# Patient Record
Sex: FEMALE | Race: WHITE | Employment: OTHER | URBAN - NONMETROPOLITAN AREA
[De-identification: names, ages, dates, MRNs, and addresses within clinical notes are randomized per-mention and may not be internally consistent; named-entity substitution may affect disease eponyms.]

---

## 2016-11-28 LAB
ALBUMIN SERPL-MCNC: 4.1 G/DL
ALP BLD-CCNC: 104 U/L
ALT SERPL-CCNC: 41 U/L
AST SERPL-CCNC: 28 U/L
BILIRUB SERPL-MCNC: 0.6 MG/DL (ref 0.1–1.4)
BUN BLDV-MCNC: 27 MG/DL
CALCIUM SERPL-MCNC: 9.4 MG/DL
CHLORIDE BLD-SCNC: 104 MMOL/L
CHOLESTEROL, TOTAL: 139 MG/DL
CHOLESTEROL/HDL RATIO: NORMAL
CO2: 28 MMOL/L
CREAT SERPL-MCNC: 0.8 MG/DL
GFR CALCULATED: NORMAL
GLUCOSE BLD-MCNC: 75 MG/DL
HDLC SERPL-MCNC: 61 MG/DL (ref 35–70)
LDL CHOLESTEROL CALCULATED: 62 MG/DL (ref 0–160)
POTASSIUM SERPL-SCNC: 4.1 MMOL/L
SODIUM BLD-SCNC: 143 MMOL/L
TOTAL PROTEIN: 6.5
TRIGL SERPL-MCNC: 81 MG/DL
VLDLC SERPL CALC-MCNC: NORMAL MG/DL

## 2017-03-07 ENCOUNTER — OFFICE VISIT (OUTPATIENT)
Dept: CARDIOLOGY CLINIC | Age: 72
End: 2017-03-07

## 2017-03-07 VITALS
SYSTOLIC BLOOD PRESSURE: 108 MMHG | WEIGHT: 98 LBS | HEIGHT: 63 IN | BODY MASS INDEX: 17.36 KG/M2 | DIASTOLIC BLOOD PRESSURE: 60 MMHG | RESPIRATION RATE: 16 BRPM | HEART RATE: 74 BPM

## 2017-03-07 DIAGNOSIS — I10 ESSENTIAL HYPERTENSION: ICD-10-CM

## 2017-03-07 DIAGNOSIS — I25.10 CAD S/P PERCUTANEOUS CORONARY ANGIOPLASTY: Primary | ICD-10-CM

## 2017-03-07 DIAGNOSIS — E78.2 MIXED HYPERLIPIDEMIA: ICD-10-CM

## 2017-03-07 DIAGNOSIS — Z98.61 CAD S/P PERCUTANEOUS CORONARY ANGIOPLASTY: Primary | ICD-10-CM

## 2017-03-07 PROCEDURE — G8484 FLU IMMUNIZE NO ADMIN: HCPCS | Performed by: INTERNAL MEDICINE

## 2017-03-07 PROCEDURE — 1123F ACP DISCUSS/DSCN MKR DOCD: CPT | Performed by: INTERNAL MEDICINE

## 2017-03-07 PROCEDURE — G8598 ASA/ANTIPLAT THER USED: HCPCS | Performed by: INTERNAL MEDICINE

## 2017-03-07 PROCEDURE — G8427 DOCREV CUR MEDS BY ELIG CLIN: HCPCS | Performed by: INTERNAL MEDICINE

## 2017-03-07 PROCEDURE — G8419 CALC BMI OUT NRM PARAM NOF/U: HCPCS | Performed by: INTERNAL MEDICINE

## 2017-03-07 PROCEDURE — 4040F PNEUMOC VAC/ADMIN/RCVD: CPT | Performed by: INTERNAL MEDICINE

## 2017-03-07 PROCEDURE — 3017F COLORECTAL CA SCREEN DOC REV: CPT | Performed by: INTERNAL MEDICINE

## 2017-03-07 PROCEDURE — 99214 OFFICE O/P EST MOD 30 MIN: CPT | Performed by: INTERNAL MEDICINE

## 2017-03-07 PROCEDURE — 3014F SCREEN MAMMO DOC REV: CPT | Performed by: INTERNAL MEDICINE

## 2017-03-07 PROCEDURE — 1036F TOBACCO NON-USER: CPT | Performed by: INTERNAL MEDICINE

## 2017-03-07 PROCEDURE — 1090F PRES/ABSN URINE INCON ASSESS: CPT | Performed by: INTERNAL MEDICINE

## 2017-03-07 PROCEDURE — 93000 ELECTROCARDIOGRAM COMPLETE: CPT | Performed by: INTERNAL MEDICINE

## 2017-03-07 PROCEDURE — G8400 PT W/DXA NO RESULTS DOC: HCPCS | Performed by: INTERNAL MEDICINE

## 2017-03-07 RX ORDER — PRAVASTATIN SODIUM 20 MG
20 TABLET ORAL DAILY
Qty: 90 TABLET | Refills: 3 | Status: SHIPPED | OUTPATIENT
Start: 2017-03-07 | End: 2018-03-06 | Stop reason: SDUPTHER

## 2017-03-07 RX ORDER — ATENOLOL 25 MG/1
25 TABLET ORAL DAILY
Qty: 90 TABLET | Refills: 3 | Status: SHIPPED | OUTPATIENT
Start: 2017-03-07 | End: 2018-03-06

## 2017-06-05 LAB
ALBUMIN SERPL-MCNC: 3.9 G/DL
ALP BLD-CCNC: 94 U/L
ALT SERPL-CCNC: 34 U/L
ANION GAP SERPL CALCULATED.3IONS-SCNC: NORMAL MMOL/L
AST SERPL-CCNC: 30 U/L
BILIRUB SERPL-MCNC: 0.5 MG/DL (ref 0.1–1.4)
BUN BLDV-MCNC: 23 MG/DL
CALCIUM SERPL-MCNC: NORMAL MG/DL
CHLORIDE BLD-SCNC: 105 MMOL/L
CHOLESTEROL, TOTAL: 146 MG/DL
CHOLESTEROL/HDL RATIO: NORMAL
CO2: NORMAL MMOL/L
CREAT SERPL-MCNC: 0.9 MG/DL
GFR CALCULATED: NORMAL
GLUCOSE BLD-MCNC: 80 MG/DL
HDLC SERPL-MCNC: 57 MG/DL (ref 35–70)
LDL CHOLESTEROL CALCULATED: 71 MG/DL (ref 0–160)
POTASSIUM SERPL-SCNC: 4.3 MMOL/L
SODIUM BLD-SCNC: 144 MMOL/L
TOTAL PROTEIN: NORMAL
TRIGL SERPL-MCNC: 89 MG/DL
VLDLC SERPL CALC-MCNC: NORMAL MG/DL

## 2017-06-15 ENCOUNTER — HOSPITAL ENCOUNTER (OUTPATIENT)
Dept: MAMMOGRAPHY | Age: 72
Discharge: HOME | End: 2017-06-15
Attending: FAMILY MEDICINE | Admitting: FAMILY MEDICINE

## 2017-06-15 DIAGNOSIS — Z12.31 VISIT FOR SCREENING MAMMOGRAM: ICD-10-CM

## 2017-12-18 LAB
ALBUMIN SERPL-MCNC: 3.9 G/DL
ALP BLD-CCNC: 86 U/L
ALT SERPL-CCNC: 34 U/L
ANION GAP SERPL CALCULATED.3IONS-SCNC: NORMAL MMOL/L
AST SERPL-CCNC: 30 U/L
BILIRUB SERPL-MCNC: 0.4 MG/DL (ref 0.1–1.4)
BUN BLDV-MCNC: 30 MG/DL
CALCIUM SERPL-MCNC: NORMAL MG/DL
CHLORIDE BLD-SCNC: 106 MMOL/L
CHOLESTEROL, TOTAL: 148 MG/DL
CHOLESTEROL/HDL RATIO: NORMAL
CO2: NORMAL MMOL/L
CREAT SERPL-MCNC: 0.9 MG/DL
GFR CALCULATED: NORMAL
GLUCOSE BLD-MCNC: 83 MG/DL
HDLC SERPL-MCNC: 57 MG/DL (ref 35–70)
LDL CHOLESTEROL CALCULATED: 68 MG/DL (ref 0–160)
POTASSIUM SERPL-SCNC: 4 MMOL/L
SODIUM BLD-SCNC: 145 MMOL/L
TOTAL PROTEIN: NORMAL
TRIGL SERPL-MCNC: 114 MG/DL
VLDLC SERPL CALC-MCNC: NORMAL MG/DL

## 2018-03-06 ENCOUNTER — OFFICE VISIT (OUTPATIENT)
Dept: CARDIOLOGY CLINIC | Age: 73
End: 2018-03-06

## 2018-03-06 VITALS
HEART RATE: 60 BPM | DIASTOLIC BLOOD PRESSURE: 68 MMHG | RESPIRATION RATE: 14 BRPM | SYSTOLIC BLOOD PRESSURE: 130 MMHG | BODY MASS INDEX: 17.01 KG/M2 | HEIGHT: 63 IN | WEIGHT: 96 LBS

## 2018-03-06 DIAGNOSIS — E78.2 MIXED HYPERLIPIDEMIA: ICD-10-CM

## 2018-03-06 DIAGNOSIS — I25.10 CAD S/P PERCUTANEOUS CORONARY ANGIOPLASTY: Primary | ICD-10-CM

## 2018-03-06 DIAGNOSIS — I10 ESSENTIAL HYPERTENSION: ICD-10-CM

## 2018-03-06 DIAGNOSIS — Z98.61 CAD S/P PERCUTANEOUS CORONARY ANGIOPLASTY: Primary | ICD-10-CM

## 2018-03-06 PROCEDURE — G8484 FLU IMMUNIZE NO ADMIN: HCPCS | Performed by: INTERNAL MEDICINE

## 2018-03-06 PROCEDURE — 3014F SCREEN MAMMO DOC REV: CPT | Performed by: INTERNAL MEDICINE

## 2018-03-06 PROCEDURE — 1036F TOBACCO NON-USER: CPT | Performed by: INTERNAL MEDICINE

## 2018-03-06 PROCEDURE — 1090F PRES/ABSN URINE INCON ASSESS: CPT | Performed by: INTERNAL MEDICINE

## 2018-03-06 PROCEDURE — G8598 ASA/ANTIPLAT THER USED: HCPCS | Performed by: INTERNAL MEDICINE

## 2018-03-06 PROCEDURE — 99214 OFFICE O/P EST MOD 30 MIN: CPT | Performed by: INTERNAL MEDICINE

## 2018-03-06 PROCEDURE — 1123F ACP DISCUSS/DSCN MKR DOCD: CPT | Performed by: INTERNAL MEDICINE

## 2018-03-06 PROCEDURE — G8419 CALC BMI OUT NRM PARAM NOF/U: HCPCS | Performed by: INTERNAL MEDICINE

## 2018-03-06 PROCEDURE — 4040F PNEUMOC VAC/ADMIN/RCVD: CPT | Performed by: INTERNAL MEDICINE

## 2018-03-06 PROCEDURE — 93000 ELECTROCARDIOGRAM COMPLETE: CPT | Performed by: INTERNAL MEDICINE

## 2018-03-06 PROCEDURE — G8400 PT W/DXA NO RESULTS DOC: HCPCS | Performed by: INTERNAL MEDICINE

## 2018-03-06 PROCEDURE — 3017F COLORECTAL CA SCREEN DOC REV: CPT | Performed by: INTERNAL MEDICINE

## 2018-03-06 PROCEDURE — G8427 DOCREV CUR MEDS BY ELIG CLIN: HCPCS | Performed by: INTERNAL MEDICINE

## 2018-03-06 RX ORDER — PRAVASTATIN SODIUM 20 MG
20 TABLET ORAL DAILY
Qty: 90 TABLET | Refills: 3 | Status: SHIPPED | OUTPATIENT
Start: 2018-03-06 | End: 2019-03-11 | Stop reason: SDUPTHER

## 2018-03-06 RX ORDER — ASCORBIC ACID 500 MG
1000 TABLET ORAL DAILY
COMMUNITY

## 2018-03-06 RX ORDER — CHOLECALCIFEROL (VITAMIN D3) 125 MCG
5 CAPSULE ORAL DAILY
COMMUNITY

## 2018-03-06 RX ORDER — HYDROCORTISONE ACETATE 0.5 %
CREAM (GRAM) TOPICAL
COMMUNITY
End: 2019-03-11

## 2018-03-06 RX ORDER — CALCIUM CARBONATE 500(1250)
500 TABLET ORAL 2 TIMES DAILY
COMMUNITY

## 2018-03-06 RX ORDER — BIOTIN 5 MG
TABLET ORAL
COMMUNITY

## 2018-03-06 RX ORDER — LANOLIN ALCOHOL/MO/W.PET/CERES
1000 CREAM (GRAM) TOPICAL DAILY
COMMUNITY

## 2018-03-06 RX ORDER — M-VIT,TX,IRON,MINS/CALC/FOLIC 27MG-0.4MG
1 TABLET ORAL DAILY
COMMUNITY

## 2018-03-06 RX ORDER — ATENOLOL 25 MG/1
12.5 TABLET ORAL DAILY
COMMUNITY
End: 2018-03-06 | Stop reason: SDUPTHER

## 2018-03-06 RX ORDER — ACETAMINOPHEN 160 MG
TABLET,DISINTEGRATING ORAL
COMMUNITY
End: 2019-03-11

## 2018-03-06 RX ORDER — NITROGLYCERIN 0.4 MG/1
0.4 TABLET SUBLINGUAL EVERY 5 MIN PRN
Qty: 25 TABLET | Refills: 2 | Status: SHIPPED | OUTPATIENT
Start: 2018-03-06 | End: 2019-03-11 | Stop reason: SDUPTHER

## 2018-03-06 RX ORDER — ALPRAZOLAM 0.25 MG/1
0.25 TABLET ORAL NIGHTLY PRN
COMMUNITY

## 2018-03-06 RX ORDER — ATENOLOL 25 MG/1
12.5 TABLET ORAL DAILY
Qty: 45 TABLET | Refills: 3 | Status: SHIPPED | OUTPATIENT
Start: 2018-03-06 | End: 2020-03-16 | Stop reason: SDUPTHER

## 2018-03-06 RX ORDER — AMPICILLIN TRIHYDRATE 250 MG
CAPSULE ORAL
COMMUNITY

## 2018-03-06 NOTE — ASSESSMENT & PLAN NOTE
Stable and asymptomatic. Continue aggressive risk modification for secondary prevention and follow-up.

## 2018-03-06 NOTE — PROGRESS NOTES
Roberto Medrano  1945  Ann-Marie Rodríguez MD    Chief complaint and HPI:  Roberto Medrano  this 71-year-old female follows up for known coronary artery disease status post PCI in , hypertension and hyperlipidemia. She denies any chest pain, shortness of breath, lightheadedness, syncope or near-syncope. She denies orthopnea or paroxysmal nocturnal dyspnea. She does have occasional palpitations last only for a few seconds. She has been very compliant to her current medications and she does exercise regularly 20 minutes 6 days a week on a treadmill. She is not a smoker. She has fibromyalgia which has been under control on Lyrica therapy. She recently had a grandchild and the has 2 sons both live in [place] and she commutes back and forth quite a bit to help him out. Rest of the Cardiovascular system review is otherwise unchanged from prior encounter. Past medical history:  has a past medical history of Fibromyalgia; H/O 24 hour EKG monitoring; H/O cardiac catheterization; H/O cardiovascular stress test; Heart attack; History of chest x-ray; History of PTCA; Hyperlipidemia; Hypertension; and MI (myocardial infarction). Past surgical history:  has a past surgical history that includes Percutaneous Transluminal Coronary Angio (); Breast biopsy ();  section; Appendectomy; Cataract removal (2013); and Skin cancer excision (2017). Social History:   Social History   Substance Use Topics    Smoking status: Never Smoker    Smokeless tobacco: Never Used    Alcohol use No     Family history: family history includes Cancer in her mother; Diabetes in her brother and father; Heart Disease in her father; High Blood Pressure in her brother; High Cholesterol in her sister. ALLERGIES:  Sulfa antibiotics  Prior to Admission medications    Medication Sig Start Date End Date Taking?  Authorizing Provider   ALPRAZolam (XANAX) 0.25 MG tablet Take 0.25 mg by mouth nightly as needed for Sleep  prescriptions if needed on this visit are addressed. After visit summery is provided. Questions answered and patient verbalizes understanding. Follow up in office in 12 months with ECG, sooner if needed. Oneyda Mcadams MD, 3/6/2018 10:27 AM     Please note this report has been partially produced using speech recognition software and may contain errors related to that system including errors in grammar, punctuation, and spelling, as well as words and phrases that may be inappropriate. If there are any questions or concerns please feel free to contact the dictating provider for clarification.

## 2018-06-28 LAB
ALBUMIN SERPL-MCNC: 4.2 G/DL
ALP BLD-CCNC: 93 U/L
ALT SERPL-CCNC: 36 U/L
ANION GAP SERPL CALCULATED.3IONS-SCNC: NORMAL MMOL/L
AST SERPL-CCNC: 35 U/L
BILIRUB SERPL-MCNC: 0.6 MG/DL (ref 0.1–1.4)
BUN BLDV-MCNC: 27 MG/DL
CALCIUM SERPL-MCNC: NORMAL MG/DL
CHLORIDE BLD-SCNC: 106 MMOL/L
CHOLESTEROL, TOTAL: 151 MG/DL
CHOLESTEROL/HDL RATIO: NORMAL
CO2: NORMAL MMOL/L
CREAT SERPL-MCNC: 1 MG/DL
GFR CALCULATED: NORMAL
GLUCOSE BLD-MCNC: 89 MG/DL
HDLC SERPL-MCNC: 55 MG/DL (ref 35–70)
LDL CHOLESTEROL CALCULATED: 75 MG/DL (ref 0–160)
POTASSIUM SERPL-SCNC: 4 MMOL/L
SODIUM BLD-SCNC: 147 MMOL/L
TOTAL PROTEIN: NORMAL
TRIGL SERPL-MCNC: 105 MG/DL
VLDLC SERPL CALC-MCNC: NORMAL MG/DL

## 2018-07-11 ENCOUNTER — HOSPITAL ENCOUNTER (OUTPATIENT)
Dept: MAMMOGRAPHY | Age: 73
Discharge: HOME | End: 2018-07-11
Attending: FAMILY MEDICINE | Admitting: FAMILY MEDICINE

## 2018-07-11 DIAGNOSIS — Z12.31 VISIT FOR SCREENING MAMMOGRAM: ICD-10-CM

## 2019-01-03 LAB
ALBUMIN SERPL-MCNC: 4 G/DL
ALP BLD-CCNC: 85 U/L
ALT SERPL-CCNC: 36 U/L
ANION GAP SERPL CALCULATED.3IONS-SCNC: NORMAL MMOL/L
AST SERPL-CCNC: 35 U/L
BILIRUB SERPL-MCNC: 0.6 MG/DL (ref 0.1–1.4)
BUN BLDV-MCNC: 35 MG/DL
CALCIUM SERPL-MCNC: NORMAL MG/DL
CHLORIDE BLD-SCNC: 105 MMOL/L
CHOLESTEROL, TOTAL: 147 MG/DL
CHOLESTEROL/HDL RATIO: NORMAL
CO2: NORMAL MMOL/L
CREAT SERPL-MCNC: 0.9 MG/DL
GFR CALCULATED: NORMAL
GLUCOSE BLD-MCNC: 88 MG/DL
HDLC SERPL-MCNC: 56 MG/DL (ref 35–70)
LDL CHOLESTEROL CALCULATED: 71 MG/DL (ref 0–160)
POTASSIUM SERPL-SCNC: 4.3 MMOL/L
SODIUM BLD-SCNC: 142 MMOL/L
TOTAL PROTEIN: NORMAL
TRIGL SERPL-MCNC: 98 MG/DL
VLDLC SERPL CALC-MCNC: NORMAL MG/DL

## 2019-03-11 ENCOUNTER — OFFICE VISIT (OUTPATIENT)
Dept: CARDIOLOGY CLINIC | Age: 74
End: 2019-03-11
Payer: MEDICARE

## 2019-03-11 VITALS
BODY MASS INDEX: 17.19 KG/M2 | RESPIRATION RATE: 16 BRPM | SYSTOLIC BLOOD PRESSURE: 106 MMHG | HEART RATE: 76 BPM | DIASTOLIC BLOOD PRESSURE: 60 MMHG | WEIGHT: 97 LBS | HEIGHT: 63 IN

## 2019-03-11 DIAGNOSIS — Z98.61 CAD S/P PERCUTANEOUS CORONARY ANGIOPLASTY: Primary | ICD-10-CM

## 2019-03-11 DIAGNOSIS — I25.10 CAD S/P PERCUTANEOUS CORONARY ANGIOPLASTY: Primary | ICD-10-CM

## 2019-03-11 DIAGNOSIS — I10 ESSENTIAL HYPERTENSION: ICD-10-CM

## 2019-03-11 DIAGNOSIS — E78.2 MIXED HYPERLIPIDEMIA: ICD-10-CM

## 2019-03-11 PROCEDURE — 93000 ELECTROCARDIOGRAM COMPLETE: CPT | Performed by: INTERNAL MEDICINE

## 2019-03-11 PROCEDURE — 99213 OFFICE O/P EST LOW 20 MIN: CPT | Performed by: INTERNAL MEDICINE

## 2019-03-11 RX ORDER — PRAVASTATIN SODIUM 20 MG
20 TABLET ORAL DAILY
Qty: 90 TABLET | Refills: 3 | Status: SHIPPED | OUTPATIENT
Start: 2019-03-11 | End: 2021-03-31

## 2019-03-11 RX ORDER — NITROGLYCERIN 0.4 MG/1
0.4 TABLET SUBLINGUAL EVERY 5 MIN PRN
Qty: 25 TABLET | Refills: 2 | Status: SHIPPED | OUTPATIENT
Start: 2019-03-11 | End: 2020-03-16 | Stop reason: SDUPTHER

## 2019-04-03 ENCOUNTER — HOSPITAL ENCOUNTER (OUTPATIENT)
Age: 74
Setting detail: OBSERVATION
Discharge: HOME | End: 2019-04-04
Attending: EMERGENCY MEDICINE | Admitting: EMERGENCY MEDICINE
Payer: MEDICARE

## 2019-04-03 ENCOUNTER — APPOINTMENT (OUTPATIENT)
Dept: GENERAL RADIOLOGY | Age: 74
End: 2019-04-03
Payer: MEDICARE

## 2019-04-03 DIAGNOSIS — K85.90 ACUTE PANCREATITIS, UNSPECIFIED COMPLICATION STATUS, UNSPECIFIED PANCREATITIS TYPE: Primary | ICD-10-CM

## 2019-04-03 PROBLEM — R10.13 EPIGASTRIC PAIN: Status: ACTIVE | Noted: 2019-04-03

## 2019-04-03 PROBLEM — A08.4 VIRAL GASTROENTERITIS: Status: ACTIVE | Noted: 2019-04-03

## 2019-04-03 LAB
ALBUMIN SERPL-MCNC: 3.7 GM/DL (ref 3.4–5)
ALP BLD-CCNC: 128 IU/L (ref 40–129)
ALT SERPL-CCNC: 15 U/L (ref 10–40)
ANION GAP SERPL CALCULATED.3IONS-SCNC: 11 MMOL/L (ref 4–16)
AST SERPL-CCNC: 20 IU/L (ref 15–37)
BACTERIA: NEGATIVE /HPF
BASOPHILS ABSOLUTE: 0 K/CU MM
BASOPHILS RELATIVE PERCENT: 0.4 % (ref 0–1)
BILIRUB SERPL-MCNC: 0.4 MG/DL (ref 0–1)
BILIRUBIN URINE: NEGATIVE MG/DL
BLOOD, URINE: NEGATIVE
BUN BLDV-MCNC: 23 MG/DL (ref 6–23)
CALCIUM SERPL-MCNC: 10.5 MG/DL (ref 8.3–10.6)
CAST TYPE: NORMAL /HPF
CHLORIDE BLD-SCNC: 100 MMOL/L (ref 99–110)
CLARITY: CLEAR
CO2: 30 MMOL/L (ref 21–32)
COLOR: YELLOW
CREAT SERPL-MCNC: 0.9 MG/DL (ref 0.6–1.1)
CRYSTAL TYPE: NORMAL /HPF
DIFFERENTIAL TYPE: ABNORMAL
EKG ATRIAL RATE: 87 BPM
EKG DIAGNOSIS: NORMAL
EKG P AXIS: 58 DEGREES
EKG P-R INTERVAL: 162 MS
EKG Q-T INTERVAL: 374 MS
EKG QRS DURATION: 88 MS
EKG QTC CALCULATION (BAZETT): 450 MS
EKG R AXIS: 52 DEGREES
EKG T AXIS: 72 DEGREES
EKG VENTRICULAR RATE: 87 BPM
EOSINOPHILS ABSOLUTE: 0.1 K/CU MM
EOSINOPHILS RELATIVE PERCENT: 1.2 % (ref 0–3)
EPITHELIAL CELLS, UA: NORMAL /HPF
GFR AFRICAN AMERICAN: >60 ML/MIN/1.73M2
GFR NON-AFRICAN AMERICAN: >60 ML/MIN/1.73M2
GLUCOSE BLD-MCNC: 111 MG/DL (ref 70–99)
GLUCOSE, URINE: NEGATIVE MG/DL
HCT VFR BLD CALC: 40.3 % (ref 37–47)
HEMOGLOBIN: 12.7 GM/DL (ref 12.5–16)
IMMATURE NEUTROPHIL %: 2.2 % (ref 0–0.43)
KETONES, URINE: NEGATIVE MG/DL
LACTATE: 1.4 MMOL/L (ref 0.4–2)
LEUKOCYTE ESTERASE, URINE: NEGATIVE
LIPASE: 80 IU/L (ref 13–60)
LYMPHOCYTES ABSOLUTE: 1.3 K/CU MM
LYMPHOCYTES RELATIVE PERCENT: 19.3 % (ref 24–44)
MCH RBC QN AUTO: 29.6 PG (ref 27–31)
MCHC RBC AUTO-ENTMCNC: 31.5 % (ref 32–36)
MCV RBC AUTO: 93.9 FL (ref 78–100)
MONOCYTES ABSOLUTE: 0.4 K/CU MM
MONOCYTES RELATIVE PERCENT: 5.5 % (ref 0–4)
MUCUS: NEGATIVE HPF
NITRITE URINE, QUANTITATIVE: NEGATIVE
PDW BLD-RTO: 12.5 % (ref 11.7–14.9)
PH, URINE: 6 (ref 5–8)
PLATELET # BLD: 405 K/CU MM (ref 140–440)
PMV BLD AUTO: 9.4 FL (ref 7.5–11.1)
POTASSIUM SERPL-SCNC: 4.3 MMOL/L (ref 3.5–5.1)
PROTEIN UA: NEGATIVE MG/DL
RBC # BLD: 4.29 M/CU MM (ref 4.2–5.4)
RBC URINE: NORMAL /HPF (ref 0–6)
SEGMENTED NEUTROPHILS ABSOLUTE COUNT: 4.8 K/CU MM
SEGMENTED NEUTROPHILS RELATIVE PERCENT: 71.4 % (ref 36–66)
SODIUM BLD-SCNC: 141 MMOL/L (ref 135–145)
SPECIFIC GRAVITY UA: <1.005 (ref 1–1.03)
TOTAL IMMATURE NEUTOROPHIL: 0.15 K/CU MM
TOTAL PROTEIN: 7.7 GM/DL (ref 6.4–8.2)
TROPONIN T: <0.01 NG/ML
UROBILINOGEN, URINE: 0.2 MG/DL (ref 0.2–1)
VOLUME, (UVOL): 12 ML (ref 10–12)
WBC # BLD: 6.8 K/CU MM (ref 4–10.5)
WBC UA: NORMAL /HPF (ref 0–5)

## 2019-04-03 PROCEDURE — 83605 ASSAY OF LACTIC ACID: CPT

## 2019-04-03 PROCEDURE — 36415 COLL VENOUS BLD VENIPUNCTURE: CPT

## 2019-04-03 PROCEDURE — 2580000003 HC RX 258: Performed by: EMERGENCY MEDICINE

## 2019-04-03 PROCEDURE — 71045 X-RAY EXAM CHEST 1 VIEW: CPT

## 2019-04-03 PROCEDURE — 80053 COMPREHEN METABOLIC PANEL: CPT

## 2019-04-03 PROCEDURE — 84484 ASSAY OF TROPONIN QUANT: CPT

## 2019-04-03 PROCEDURE — 93010 ELECTROCARDIOGRAM REPORT: CPT | Performed by: INTERNAL MEDICINE

## 2019-04-03 PROCEDURE — 93005 ELECTROCARDIOGRAM TRACING: CPT | Performed by: EMERGENCY MEDICINE

## 2019-04-03 PROCEDURE — 83690 ASSAY OF LIPASE: CPT

## 2019-04-03 PROCEDURE — 6370000000 HC RX 637 (ALT 250 FOR IP): Performed by: FAMILY MEDICINE

## 2019-04-03 PROCEDURE — 85025 COMPLETE CBC W/AUTO DIFF WBC: CPT

## 2019-04-03 PROCEDURE — 2580000003 HC RX 258: Performed by: FAMILY MEDICINE

## 2019-04-03 PROCEDURE — G0378 HOSPITAL OBSERVATION PER HR: HCPCS

## 2019-04-03 PROCEDURE — 81001 URINALYSIS AUTO W/SCOPE: CPT

## 2019-04-03 PROCEDURE — 99284 EMERGENCY DEPT VISIT MOD MDM: CPT

## 2019-04-03 RX ORDER — NITROGLYCERIN 0.4 MG/1
0.4 TABLET SUBLINGUAL EVERY 5 MIN PRN
Status: DISCONTINUED | OUTPATIENT
Start: 2019-04-03 | End: 2019-04-04 | Stop reason: HOSPADM

## 2019-04-03 RX ORDER — FAMOTIDINE 20 MG/1
20 TABLET, FILM COATED ORAL 2 TIMES DAILY
Status: DISCONTINUED | OUTPATIENT
Start: 2019-04-03 | End: 2019-04-03 | Stop reason: DRUGHIGH

## 2019-04-03 RX ORDER — ASPIRIN 81 MG/1
81 TABLET, CHEWABLE ORAL DAILY
Status: DISCONTINUED | OUTPATIENT
Start: 2019-04-04 | End: 2019-04-04 | Stop reason: HOSPADM

## 2019-04-03 RX ORDER — HEPARIN SODIUM 5000 [USP'U]/ML
5000 INJECTION, SOLUTION INTRAVENOUS; SUBCUTANEOUS 2 TIMES DAILY
Status: DISCONTINUED | OUTPATIENT
Start: 2019-04-03 | End: 2019-04-04 | Stop reason: HOSPADM

## 2019-04-03 RX ORDER — 0.9 % SODIUM CHLORIDE 0.9 %
1000 INTRAVENOUS SOLUTION INTRAVENOUS ONCE
Status: COMPLETED | OUTPATIENT
Start: 2019-04-03 | End: 2019-04-03

## 2019-04-03 RX ORDER — ONDANSETRON 2 MG/ML
4 INJECTION INTRAMUSCULAR; INTRAVENOUS EVERY 6 HOURS PRN
Status: DISCONTINUED | OUTPATIENT
Start: 2019-04-03 | End: 2019-04-04 | Stop reason: HOSPADM

## 2019-04-03 RX ORDER — CALCIUM CARBONATE 500(1250)
500 TABLET ORAL 2 TIMES DAILY
Status: DISCONTINUED | OUTPATIENT
Start: 2019-04-03 | End: 2019-04-04 | Stop reason: HOSPADM

## 2019-04-03 RX ORDER — PREGABALIN 50 MG/1
50 CAPSULE ORAL NIGHTLY
Status: DISCONTINUED | OUTPATIENT
Start: 2019-04-03 | End: 2019-04-04 | Stop reason: HOSPADM

## 2019-04-03 RX ORDER — SODIUM CHLORIDE 0.9 % (FLUSH) 0.9 %
10 SYRINGE (ML) INJECTION EVERY 12 HOURS SCHEDULED
Status: DISCONTINUED | OUTPATIENT
Start: 2019-04-03 | End: 2019-04-04 | Stop reason: HOSPADM

## 2019-04-03 RX ORDER — ALPRAZOLAM 0.25 MG/1
0.25 TABLET ORAL NIGHTLY PRN
Status: DISCONTINUED | OUTPATIENT
Start: 2019-04-03 | End: 2019-04-04 | Stop reason: HOSPADM

## 2019-04-03 RX ORDER — CHOLECALCIFEROL (VITAMIN D3) 125 MCG
5 CAPSULE ORAL DAILY
Status: DISCONTINUED | OUTPATIENT
Start: 2019-04-03 | End: 2019-04-03 | Stop reason: CLARIF

## 2019-04-03 RX ORDER — SODIUM CHLORIDE 9 MG/ML
INJECTION, SOLUTION INTRAVENOUS CONTINUOUS
Status: DISCONTINUED | OUTPATIENT
Start: 2019-04-03 | End: 2019-04-04 | Stop reason: HOSPADM

## 2019-04-03 RX ORDER — SODIUM CHLORIDE 0.9 % (FLUSH) 0.9 %
10 SYRINGE (ML) INJECTION PRN
Status: DISCONTINUED | OUTPATIENT
Start: 2019-04-03 | End: 2019-04-04 | Stop reason: HOSPADM

## 2019-04-03 RX ORDER — ATENOLOL 25 MG/1
12.5 TABLET ORAL DAILY
Status: DISCONTINUED | OUTPATIENT
Start: 2019-04-04 | End: 2019-04-04 | Stop reason: HOSPADM

## 2019-04-03 RX ORDER — FAMOTIDINE 20 MG/1
20 TABLET, FILM COATED ORAL DAILY
Status: DISCONTINUED | OUTPATIENT
Start: 2019-04-03 | End: 2019-04-04 | Stop reason: HOSPADM

## 2019-04-03 RX ADMIN — FAMOTIDINE 20 MG: 20 TABLET ORAL at 15:32

## 2019-04-03 RX ADMIN — SODIUM CHLORIDE: 9 INJECTION, SOLUTION INTRAVENOUS at 15:32

## 2019-04-03 RX ADMIN — MELATONIN TAB 3 MG 5 MG: 3 TAB at 20:49

## 2019-04-03 RX ADMIN — SODIUM CHLORIDE 1000 ML: 9 INJECTION, SOLUTION INTRAVENOUS at 09:29

## 2019-04-03 RX ADMIN — PREGABALIN 50 MG: 50 CAPSULE ORAL at 20:49

## 2019-04-03 RX ADMIN — Medication 500 MG: at 20:49

## 2019-04-03 ASSESSMENT — PAIN SCALES - GENERAL
PAINLEVEL_OUTOF10: 0
PAINLEVEL_OUTOF10: 4
PAINLEVEL_OUTOF10: 4

## 2019-04-03 ASSESSMENT — PAIN DESCRIPTION - DESCRIPTORS: DESCRIPTORS: ACHING

## 2019-04-03 ASSESSMENT — PAIN DESCRIPTION - ORIENTATION: ORIENTATION: LOWER;POSTERIOR

## 2019-04-03 ASSESSMENT — PAIN DESCRIPTION - PROGRESSION: CLINICAL_PROGRESSION: NOT CHANGED

## 2019-04-03 ASSESSMENT — PAIN DESCRIPTION - PAIN TYPE
TYPE: ACUTE PAIN
TYPE: ACUTE PAIN

## 2019-04-03 ASSESSMENT — PAIN DESCRIPTION - FREQUENCY: FREQUENCY: CONTINUOUS

## 2019-04-03 ASSESSMENT — PAIN DESCRIPTION - ONSET: ONSET: PROGRESSIVE

## 2019-04-03 ASSESSMENT — PAIN - FUNCTIONAL ASSESSMENT: PAIN_FUNCTIONAL_ASSESSMENT: PREVENTS OR INTERFERES SOME ACTIVE ACTIVITIES AND ADLS

## 2019-04-03 ASSESSMENT — PAIN DESCRIPTION - LOCATION
LOCATION: HEAD
LOCATION: HEAD

## 2019-04-03 NOTE — ED NOTES
I assisted this patient to the restroom. She walked with no difficulty. She provided a urine sample that I took the the lab.      Mic Ramirez  04/03/19 4151

## 2019-04-03 NOTE — ED NOTES
Dr. Tyrell Weber is speaking with Dr. Francine Trinidad regarding admission for this patient.      Rj Cordon  04/03/19 1111

## 2019-04-03 NOTE — ED PROVIDER NOTES
Emergency Department Encounter  Location: [facility] [address] [place] [address]    Patient: Chelo Turner  MRN: [id number]  : 1945  Date of evaluation: 4/3/2019  ED Provider: Jael Alcantar DO, FACEP    Chief Complaint:    Fatigue (Pt arrives ambulatory stating since 3/23/2019 started upper respiratory illness and weakness, pt has been to urgent care x2 dx with flu, tamilflu and Zpack, pt states yesterday she felt worse); Headache (pt has headache, weakness, epigastric pain and not able to eat. Pt concerned d/t heart rate high and blood pressure high yesterday went to family doctor today who sent to ed); and Abdominal Pain    [place]:  Chelo Turner is a 69 y.o. female that presents to the emergency department with complaints of upper respiratory illness and generalized weakness that's been present for about 12 days. She was started on Tamiflu which she completed and then was recently started on a Z-Akin but states she is no better. She's complaining of high blood pressure and high heart rate that was present yesterday. She is having generalized abdominal discomfort. She denies chest pain or shortness of breath but states she is quite fatigued. She's had no fevers but is complaining of congestion in her sinuses. She denies sore throat or cough. ROS:  At least 10 systems reviewed and otherwise acutely negative except as in the [address]     Past Medical History:   Diagnosis Date    Fibromyalgia     H/O 24 hour EKG monitoring 2001    nsr averate rate 70bpm variation between   rare isolated pacs no ventricular ectopy noted    H/O cardiac catheterization 2008    Lm normal, lad normal, cx patent, rca dominant and patent,  lv hyperdynamic wall motion ef 61    H/O cardiovascular stress test 2008    mild ischemia in the mid anterior mid anteroseptal an dapical anterior regions, this is new change from previous study, ef 70 exercise capacity 7 mets    Heart attack ([facility] [address]     History of sexual activity: Not on file   Other Topics Concern    Not on file   Social History Narrative    Not on file     No current facility-administered medications for this encounter. Current Outpatient Medications   Medication Sig Dispense Refill    nitroGLYCERIN (NITROSTAT) 0.4 MG SL tablet Place 1 tablet under the tongue every 5 minutes as needed (prn) 25 tablet 2    pravastatin (PRAVACHOL) 20 MG tablet Take 1 tablet by mouth daily 90 tablet 3    ALPRAZolam (XANAX) 0.25 MG tablet Take 0.25 mg by mouth nightly as needed for Sleep 1/4 tablet as needed .  Multiple Vitamins-Minerals (THERAPEUTIC MULTIVITAMIN-MINERALS) tablet Take 1 tablet by mouth daily      Coenzyme Q10 (COQ10) 200 MG CAPS Take by mouth      melatonin 5 MG TABS tablet Take 5 mg by mouth daily      vitamin B-12 (CYANOCOBALAMIN) 1000 MCG tablet Take 1,000 mcg by mouth daily      Omega-3 Fatty Acids (MINI FISH OIL) 645 MG CAPS Take by mouth      calcium carbonate (OSCAL) 500 MG TABS tablet Take 500 mg by mouth 2 times daily      vitamin C (ASCORBIC ACID) 500 MG tablet Take 1,000 mg by mouth daily      Biotin 5 MG TABS Take by mouth      atenolol (TENORMIN) 25 MG tablet Take 0.5 tablets by mouth daily 45 tablet 3    aspirin 81 MG tablet Take 81 mg by mouth daily.  pregabalin (LYRICA) 50 MG capsule Take 50 mg by mouth nightly. Allergies   Allergen Reactions    Sulfa Antibiotics Other (See Comments)     Hands and feet peeled. Nausea       Nursing Notes Reviewed    Physical Exam:  ED Triage Vitals [04/03/19 0850]   Enc Vitals Group      BP (!) 160/96      Pulse 99      Resp 16      Temp 97.9 °F (36.6 °C)      Temp Source Oral      SpO2 100 %      Weight 95 lb (43.1 kg)      Height 5' 3\" (1.6 m)      Head Circumference       Peak Flow       Pain Score       Pain Loc       Pain Edu? Excl. in [address]? GENERAL APPEARANCE: Awake and alert. Cooperative. No acute distress. Nontoxic in appearance  HEAD: Normocephalic. Atraumatic. EYES: EOM's grossly intact. Sclera anicteric. ENT: Tolerates saliva. No trismus. Positive for posterior pharyngeal drainage. Tympanic membranes are clear bilaterally. NECK: Supple. Trachea midline. No meningeal signs  CARDIO: RRR. Radial pulse 2+. LUNGS: Respirations unlabored. CTAB. ABDOMEN: Soft. Non-distended. Non-tender. EXTREMITIES: No acute deformities. No peripheral edema seen  SKIN: Warm and dry. NEUROLOGICAL: No gross facial drooping. Moves all 4 extremities spontaneously. PSYCHIATRIC: Normal mood.      Labs:  Results for orders placed or performed during the hospital encounter of 04/03/19   CBC Auto Differential   Result Value Ref Range    WBC 6.8 4.0 - 10.5 K/CU MM    RBC 4.29 4.2 - 5.4 M/CU MM    Hemoglobin 12.7 12.5 - 16.0 GM/DL    Hematocrit 40.3 37 - 47 %    MCV 93.9 78 - 100 FL    MCH 29.6 27 - 31 PG    MCHC 31.5 (L) 32.0 - 36.0 %    RDW 12.5 11.7 - 14.9 %    Platelets 187 215 - 722 K/CU MM    MPV 9.4 7.5 - 11.1 FL    Differential Type AUTOMATED DIFFERENTIAL     Segs Relative 71.4 (H) 36 - 66 %    Lymphocytes % 19.3 (L) 24 - 44 %    Monocytes % 5.5 (H) 0 - 4 %    Eosinophils % 1.2 0 - 3 %    Basophils % 0.4 0 - 1 %    Segs Absolute 4.8 K/CU MM    Lymphocytes # 1.3 K/CU MM    Monocytes # 0.4 K/CU MM    Eosinophils # 0.1 K/CU MM    Basophils # 0.0 K/CU MM    Immature Neutrophil % 2.2 (H) 0 - 0.43 %    Total Immature Neutrophil 0.15 K/CU MM   Comprehensive Metabolic Panel   Result Value Ref Range    Sodium 141 135 - 145 MMOL/L    Potassium 4.3 3.5 - 5.1 MMOL/L    Chloride 100 99 - 110 mMol/L    CO2 30 21 - 32 MMOL/L    BUN 23 6 - 23 MG/DL    CREATININE 0.9 0.6 - 1.1 MG/DL    Glucose 111 (H) 70 - 99 MG/DL    Calcium 10.5 8.3 - 10.6 MG/DL    Alb 3.7 3.4 - 5.0 GM/DL    Total Protein 7.7 6.4 - 8.2 GM/DL    Total Bilirubin 0.4 0.0 - 1.0 MG/DL    ALT 15 10 - 40 U/L    AST 20 15 - 37 IU/L    Alkaline Phosphatase 128 40 - 129 IU/L    GFR Non-African American >60 >60 mL/min/1.73m2 GFR African American >60 >60 mL/min/1.73m2    Anion Gap 11 4 - 16   Lactic Acid, Plasma   Result Value Ref Range    Lactate 1.4 0.4 - 2.0 mMOL/L   Urinalysis with Microscopic   Result Value Ref Range    Color, UA YELLOW YELLOW    Clarity, UA CLEAR CLEAR    Glucose, Urine NEGATIVE NEGATIVE MG/DL    Bilirubin Urine NEGATIVE NEGATIVE MG/DL    Ketones, Urine NEGATIVE NEGATIVE MG/DL    Specific Gravity, UA <1.005 1.001 - 1.035    Blood, Urine NEGATIVE NEGATIVE    pH, Urine 6.0 5.0 - 8.0    Protein, UA NEGATIVE NEGATIVE MG/DL    Urobilinogen, Urine 0.2 0.2 - 1.0 MG/DL    Nitrite Urine, Quantitative NEGATIVE NEGATIVE    Leukocyte Esterase, Urine NEGATIVE NEGATIVE    Volume, (UVOL) 12 10 - 12 ML    RBC, UA NO CELLS SEEN 0 - 6 /HPF    WBC, UA 0 TO 1 0 - 5 /HPF    Epi Cells NO CELLS SEEN /HPF    Cast Type NO CAST FORMS SEEN NO CAST FORMS SEEN /HPF    Bacteria, UA NEGATIVE NEGATIVE /HPF    Crystal Type NONE SEEN NEGATIVE /HPF    Mucus, UA NEGATIVE NEGATIVE HPF   Troponin   Result Value Ref Range    Troponin T <0.010 <0.01 NG/ML   Lipase   Result Value Ref Range    Lipase 80 (H) 13 - 60 IU/L   EKG 12 Lead   Result Value Ref Range    Ventricular Rate 87 BPM    Atrial Rate 87 BPM    P-R Interval 162 ms    QRS Duration 88 ms    Q-T Interval 374 ms    QTc Calculation (Bazett) 450 ms    P Axis 58 degrees    R Axis 52 degrees    T Axis 72 degrees    Diagnosis       Normal sinus rhythm  Normal ECG  No previous ECGs available         EKG (if obtained): (All EKG's are interpreted by myself in the absence of a cardiologist)  The Ekg interpreted by me in the absence of a cardiologist shows. normal sinus rhythm with a rate of 87  Axis is   Normal   QTc is  450  Intervals and Durations are unremarkable. No specific ST-T wave changes appreciated. No evidence of acute ischemia.    No significant change from prior EKG dated 11 March 2019      Radiographs (if obtained):  [] The following radiograph was interpreted by myself in the absence of a radiologist:  [x] Radiologist's Report reviewed at time of ED visit:  XR CHEST PORTABLE   Final Result   1. No acute cardiopulmonary disease. 2. COPD. ED Course and MDM:  Here in emergency department the patient's lipase is slightly elevated at 80. She is slightly tender in her epigastric region. She may have some medication or virally induced pancreatitis. At this time the patient has had a liter of fluid and states she's feeling slightly better. I have discussed her care with Dr. Felicia Keyes and he has agreed to admit her for observation and IV fluids to see if she will perk up after an overnight stay in the hospital.    Final Impression:  1. Acute pancreatitis, unspecified complication status, unspecified pancreatitis type      DISPOSITION Decision To Admit    Patient referred to: No follow-up provider specified.   Discharge medications:  New Prescriptions    No medications on file     (Please note that portions of this note may have been completed with a voice recognition program. Efforts were made to edit the dictations but occasionally words are mis-transcribed.)    Stevie Hunt DO, [facility]  Board certified in [address] [place] [address]  04/03/19 7706

## 2019-04-03 NOTE — H&P
Paulette Naik is a 69 y.o. who is admitted for generalized weakness with nausea vomiting and diarrhea  Hospital Day: 1    ASSESSMENT/PLAN:  1.  Nausea vomiting and diarrhea: Likely viral gastroenteritis, getting better now, has not had any diarrhea for about 12 hours, still a bit nauseous but has had no vomiting's either. We will continue with IV hydration and we'll see how she does. Next    Elevated lipase: Likely just a viral syndrome causing this, symptoms are inconsistent with the pancreatitis, we'll continue to monitor her and she will continue with a general diet and we'll check on this again tomorrow. Next    Hypertension with CAD: Appears asymptomatic, continue with the atenolol I without any changes in the doses    Anxiety disorder: Patient was mild anxiety, we'll continue with the Xanax that she gets nightly p.r.n. Diet  Gen. DVT Prophylaxis [] Lovenox, [x] Heparin, [] SCDs, [] Ambulation   GI Prophylaxis [] PPI, [x] H2 Blocker, [] Carafate, [] Diet/Tube Feeds   Code Status Full Code   Disposition Observation    MDM [] Low, [x] Moderate,[] High  Patient's risk as above due to complexity of data reviewed and medical management options         Chief Complaint:  Feeling generally unwell with some nausea vomiting    PCP: Gabriel Ochoa MD    Date of Admission: 4/3/2019        History Of Present Illness: The patient is a 69 y.o. female who [place] [facility] [place] with generally unwell and nausea vomiting, patient apparently was diagnosed with the influenza was given steroids and the Tamiflu about 2 weeks ago was still not feeling well with some nausea vomiting went over to her primary care without the patient was dehydrated and sent her over to the emergency room.   Patient denied any obvious sick contacts, no recent travel or unusual food habits, no antibiotics either    Past Medical History:

## 2019-04-03 NOTE — PROGRESS NOTES
Radha called for bed assignment for this patient for inpatient unit, this nurse assigned bed 14 to patient.

## 2019-04-04 VITALS
TEMPERATURE: 97 F | BODY MASS INDEX: 16.43 KG/M2 | HEART RATE: 80 BPM | HEIGHT: 63 IN | RESPIRATION RATE: 16 BRPM | OXYGEN SATURATION: 95 % | DIASTOLIC BLOOD PRESSURE: 74 MMHG | WEIGHT: 92.7 LBS | SYSTOLIC BLOOD PRESSURE: 133 MMHG

## 2019-04-04 PROBLEM — R10.13 EPIGASTRIC PAIN: Status: RESOLVED | Noted: 2019-04-03 | Resolved: 2019-04-04

## 2019-04-04 PROBLEM — A08.4 VIRAL GASTROENTERITIS: Status: RESOLVED | Noted: 2019-04-03 | Resolved: 2019-04-04

## 2019-04-04 LAB
EKG ATRIAL RATE: 84 BPM
EKG DIAGNOSIS: NORMAL
EKG P AXIS: 70 DEGREES
EKG P-R INTERVAL: 180 MS
EKG Q-T INTERVAL: 390 MS
EKG QRS DURATION: 92 MS
EKG QTC CALCULATION (BAZETT): 460 MS
EKG R AXIS: 50 DEGREES
EKG T AXIS: 72 DEGREES
EKG VENTRICULAR RATE: 84 BPM
HCT VFR BLD CALC: 35.5 % (ref 37–47)
HEMOGLOBIN: 10.9 GM/DL (ref 12.5–16)
LIPASE: 65 IU/L (ref 13–60)
MCH RBC QN AUTO: 29.6 PG (ref 27–31)
MCHC RBC AUTO-ENTMCNC: 30.7 % (ref 32–36)
MCV RBC AUTO: 96.5 FL (ref 78–100)
PDW BLD-RTO: 12.6 % (ref 11.7–14.9)
PLATELET # BLD: 393 K/CU MM (ref 140–440)
PMV BLD AUTO: 9.4 FL (ref 7.5–11.1)
RBC # BLD: 3.68 M/CU MM (ref 4.2–5.4)
WBC # BLD: 5.1 K/CU MM (ref 4–10.5)

## 2019-04-04 PROCEDURE — 85027 COMPLETE CBC AUTOMATED: CPT

## 2019-04-04 PROCEDURE — 2580000003 HC RX 258: Performed by: FAMILY MEDICINE

## 2019-04-04 PROCEDURE — 93005 ELECTROCARDIOGRAM TRACING: CPT | Performed by: FAMILY MEDICINE

## 2019-04-04 PROCEDURE — 93010 ELECTROCARDIOGRAM REPORT: CPT | Performed by: INTERNAL MEDICINE

## 2019-04-04 PROCEDURE — G0378 HOSPITAL OBSERVATION PER HR: HCPCS

## 2019-04-04 PROCEDURE — 83690 ASSAY OF LIPASE: CPT

## 2019-04-04 PROCEDURE — 36415 COLL VENOUS BLD VENIPUNCTURE: CPT

## 2019-04-04 PROCEDURE — 6370000000 HC RX 637 (ALT 250 FOR IP): Performed by: FAMILY MEDICINE

## 2019-04-04 RX ADMIN — FAMOTIDINE 20 MG: 20 TABLET ORAL at 08:38

## 2019-04-04 RX ADMIN — Medication 500 MG: at 08:38

## 2019-04-04 RX ADMIN — ASPIRIN 81 MG 81 MG: 81 TABLET ORAL at 08:38

## 2019-04-04 RX ADMIN — ATENOLOL 12.5 MG: 25 TABLET ORAL at 08:38

## 2019-04-04 RX ADMIN — SODIUM CHLORIDE: 9 INJECTION, SOLUTION INTRAVENOUS at 04:45

## 2019-04-04 ASSESSMENT — PAIN SCALES - GENERAL
PAINLEVEL_OUTOF10: 0

## 2019-04-04 NOTE — CARE COORDINATION
CM met with the patient for discharge planning. Patient lives at home with her , has insurance with Rx coverage & PCP, states she was independent with ADL's prior to admission, is retired, and still drives. Patient is unable to identify any needs at this time, CM available if needs arise.

## 2019-04-04 NOTE — PLAN OF CARE
Problem: Pain:  Goal: Pain level will decrease  Description  Pain level will decrease  Outcome: Ongoing  Goal: Control of acute pain  Description  Control of acute pain  Outcome: Ongoing  Goal: Control of chronic pain  Description  Control of chronic pain  Outcome: Ongoing     Problem: Discharge Planning:  Goal: Patients continuum of care needs are met  Description  Patients continuum of care needs are met  Outcome: Ongoing     Problem: Discharge Planning:  Goal: Discharged to appropriate level of care  Description  Discharged to appropriate level of care  Outcome: Ongoing     Problem: Pain:  Goal: Pain level will decrease  Description  Pain level will decrease  Outcome: Ongoing  Goal: Control of acute pain  Description  Control of acute pain  Outcome: Ongoing  Goal: Control of chronic pain  Description  Control of chronic pain  Outcome: Ongoing     Problem: Discharge Planning:  Goal: Patients continuum of care needs are met  Description  Patients continuum of care needs are met  Outcome: Ongoing     Problem: Discharge Planning:  Goal: Discharged to appropriate level of care  Description  Discharged to appropriate level of care  Outcome: Ongoing     Problem: Pain:  Goal: Pain level will decrease  Description  Pain level will decrease  Outcome: Ongoing  Goal: Control of acute pain  Description  Control of acute pain  Outcome: Ongoing  Goal: Control of chronic pain  Description  Control of chronic pain  Outcome: Ongoing     Problem: Discharge Planning:  Goal: Patients continuum of care needs are met  Description  Patients continuum of care needs are met  Outcome: Ongoing     Problem: Discharge Planning:  Goal: Discharged to appropriate level of care  Description  Discharged to appropriate level of care  Outcome: Ongoing     Problem: Pain:  Goal: Pain level will decrease  Description  Pain level will decrease  Outcome: Ongoing  Goal: Control of acute pain  Description  Control of acute pain  Outcome: Ongoing  Goal: Control of chronic pain  Description  Control of chronic pain  Outcome: Ongoing     Problem: Discharge Planning:  Goal: Patients continuum of care needs are met  Description  Patients continuum of care needs are met  Outcome: Ongoing     Problem: Discharge Planning:  Goal: Discharged to appropriate level of care  Description  Discharged to appropriate level of care  Outcome: Ongoing

## 2019-04-04 NOTE — DISCHARGE SUMMARY
Stable    Prognosis: Good    Disposition: Home      Follow-up with   1. PCP within   2-3    Days    Follow up labs: None       Discharge Physician Signed: Jayesh Henson M.D. Time spent on discharge in the examination, evaluation, counseling and review of medications and discharge plan: 25 minutes    Electronically signed by Jayesh Henson MD on 4/4/2019 at 9:37 AM      Comment: Please note this report has been produced using speech recognition software and may contain errors related to that system including errors in grammar, punctuation, and spelling, as well as words and phrases that may be inappropriate.  If there are any questions or concerns please feel free to contact the dictating provider for clarification

## 2019-07-18 ENCOUNTER — HOSPITAL ENCOUNTER (OUTPATIENT)
Dept: MAMMOGRAPHY | Age: 74
Discharge: HOME | End: 2019-07-18
Payer: MEDICARE

## 2019-07-18 DIAGNOSIS — Z12.31 VISIT FOR SCREENING MAMMOGRAM: ICD-10-CM

## 2019-07-18 PROCEDURE — 77063 BREAST TOMOSYNTHESIS BI: CPT

## 2020-01-17 LAB
ALBUMIN SERPL-MCNC: 4.1 G/DL
ALP BLD-CCNC: 96 U/L
ALT SERPL-CCNC: 20 U/L
ANION GAP SERPL CALCULATED.3IONS-SCNC: NORMAL MMOL/L
AST SERPL-CCNC: 29 U/L
BILIRUB SERPL-MCNC: 0.6 MG/DL (ref 0.1–1.4)
BUN BLDV-MCNC: 37 MG/DL
CALCIUM SERPL-MCNC: 9.7 MG/DL
CHLORIDE BLD-SCNC: 104 MMOL/L
CHOLESTEROL, TOTAL: 166 MG/DL
CHOLESTEROL/HDL RATIO: NORMAL
CO2: NORMAL
CREAT SERPL-MCNC: 1 MG/DL
GFR CALCULATED: NORMAL
GLUCOSE BLD-MCNC: 88 MG/DL
HDLC SERPL-MCNC: 57 MG/DL (ref 35–70)
LDL CHOLESTEROL CALCULATED: 90 MG/DL (ref 0–160)
POTASSIUM SERPL-SCNC: 4.3 MMOL/L
SODIUM BLD-SCNC: 142 MMOL/L
TOTAL PROTEIN: NORMAL
TRIGL SERPL-MCNC: 96 MG/DL
VLDLC SERPL CALC-MCNC: NORMAL MG/DL

## 2020-03-16 ENCOUNTER — OFFICE VISIT (OUTPATIENT)
Dept: CARDIOLOGY CLINIC | Age: 75
End: 2020-03-16
Payer: MEDICARE

## 2020-03-16 VITALS
BODY MASS INDEX: 17.36 KG/M2 | DIASTOLIC BLOOD PRESSURE: 64 MMHG | SYSTOLIC BLOOD PRESSURE: 126 MMHG | HEIGHT: 63 IN | HEART RATE: 70 BPM | WEIGHT: 98 LBS

## 2020-03-16 PROCEDURE — 1036F TOBACCO NON-USER: CPT | Performed by: INTERNAL MEDICINE

## 2020-03-16 PROCEDURE — G8419 CALC BMI OUT NRM PARAM NOF/U: HCPCS | Performed by: INTERNAL MEDICINE

## 2020-03-16 PROCEDURE — G8427 DOCREV CUR MEDS BY ELIG CLIN: HCPCS | Performed by: INTERNAL MEDICINE

## 2020-03-16 PROCEDURE — 1090F PRES/ABSN URINE INCON ASSESS: CPT | Performed by: INTERNAL MEDICINE

## 2020-03-16 PROCEDURE — 1123F ACP DISCUSS/DSCN MKR DOCD: CPT | Performed by: INTERNAL MEDICINE

## 2020-03-16 PROCEDURE — 3017F COLORECTAL CA SCREEN DOC REV: CPT | Performed by: INTERNAL MEDICINE

## 2020-03-16 PROCEDURE — 93000 ELECTROCARDIOGRAM COMPLETE: CPT | Performed by: INTERNAL MEDICINE

## 2020-03-16 PROCEDURE — G8484 FLU IMMUNIZE NO ADMIN: HCPCS | Performed by: INTERNAL MEDICINE

## 2020-03-16 PROCEDURE — 4040F PNEUMOC VAC/ADMIN/RCVD: CPT | Performed by: INTERNAL MEDICINE

## 2020-03-16 PROCEDURE — G8400 PT W/DXA NO RESULTS DOC: HCPCS | Performed by: INTERNAL MEDICINE

## 2020-03-16 PROCEDURE — 99214 OFFICE O/P EST MOD 30 MIN: CPT | Performed by: INTERNAL MEDICINE

## 2020-03-16 RX ORDER — PRAVASTATIN SODIUM 20 MG
20 TABLET ORAL DAILY
COMMUNITY
End: 2020-03-16 | Stop reason: SDUPTHER

## 2020-03-16 RX ORDER — PRAVASTATIN SODIUM 20 MG
20 TABLET ORAL DAILY
Qty: 90 TABLET | Refills: 3 | Status: SHIPPED | OUTPATIENT
Start: 2020-03-16 | End: 2021-03-31 | Stop reason: SDUPTHER

## 2020-03-16 RX ORDER — NITROGLYCERIN 0.4 MG/1
0.4 TABLET SUBLINGUAL EVERY 5 MIN PRN
Qty: 25 TABLET | Refills: 2 | Status: SHIPPED | OUTPATIENT
Start: 2020-03-16 | End: 2021-03-31 | Stop reason: SDUPTHER

## 2020-03-16 RX ORDER — ATENOLOL 25 MG/1
25 TABLET ORAL DAILY
Qty: 90 TABLET | Refills: 3 | Status: SHIPPED | OUTPATIENT
Start: 2020-03-16 | End: 2021-03-31 | Stop reason: SDUPTHER

## 2020-03-16 NOTE — PROGRESS NOTES
or thyromegaly  Cardiovascular:  Auscultation: Normal S1 and S2.  No murmurs or gallops noted. Carotids negative for bruits.  Abdominal aorta is normal.  No epigastric bruit noted. Peripheral pulses: 1+ equal on both sides  Respiratory:  Respiratory effort is normal  Breath sounds are clear to auscultation and percussion  Extremities:  No edema, clubbing,  Cyanosis, petechiae. SKIN: Warm and well perfused, no pallor or cyanosis  Abdomen:  No masses or tenderness. No organomegaly noted. Musculoskeletal:  No significant spinal deformities noted.  Gait is normal  Muscle strength is normal  Neurologic:  Oriented to time, place, and person and non-anxious. No focal neurological deficit noted. Psychiatric: Normal mood and effect. Neurologic exam:  No focal sensory or motor deficit    EKG today is consistent with normal sinus rhythm 70 bpm.    LAB REVIEW:  CBC:   Lab Results   Component Value Date    WBC 5.1 04/04/2019    HGB 10.9 04/04/2019    HCT 35.5 04/04/2019     04/04/2019     Lipids:   Lab Results   Component Value Date    CHOL 166 01/17/2020    TRIG 96 01/17/2020    HDL 57 01/17/2020    [facility] 90 01/17/2020     Renal:   Lab Results   Component Value Date    BUN 37 01/17/2020    CREATININE 1.00 01/17/2020     01/17/2020    K 4.3 01/17/2020     IMPRESSION and RECOMMENDATIONS:      CAD S/P percutaneous coronary angioplasty  Clinically stable. Continue aggressive risk modification for secondary prevention. Hyperlipidemia  Last LDL was 90. She has aches and pains related to fibromyalgia. Continue current low-dose statin therapy. Essential hypertension  Fairly well controlled on current medications continue the same. Continue current cardiovascular medications which have been reviewed and discussed individually with you. Primary/secondary prevention is the goal by aggressive risk modification, healthy and therapeutic life style changes for cardiovascular risk reduction.  Various goals are discussed and questions answered. Appropriate prescriptions if needed on this visit are addressed. After visit summery is provided. Questions answered and patient verbalizes understanding. Follow up in 12 months with ECG,  sooner if needed. Jasvir Coulter MD, 3/16/2020 2:27 PM     Please note this report has been partially produced using speech recognition software and may contain errors related to that system including errors in grammar, punctuation, and spelling, as well as words and phrases that may be inappropriate. If there are any questions or concerns please feel free to contact the dictating provider for clarification.

## 2020-03-16 NOTE — ASSESSMENT & PLAN NOTE
Last LDL was 90. She has aches and pains related to fibromyalgia. Continue current low-dose statin therapy.

## 2021-03-22 ENCOUNTER — OFFICE VISIT (OUTPATIENT)
Dept: CARDIOLOGY CLINIC | Age: 76
End: 2021-03-22
Payer: MEDICARE

## 2021-03-22 VITALS
HEART RATE: 80 BPM | BODY MASS INDEX: 17.12 KG/M2 | SYSTOLIC BLOOD PRESSURE: 128 MMHG | HEIGHT: 63 IN | WEIGHT: 96.6 LBS | DIASTOLIC BLOOD PRESSURE: 68 MMHG | TEMPERATURE: 97 F

## 2021-03-22 DIAGNOSIS — R06.02 SHORTNESS OF BREATH: ICD-10-CM

## 2021-03-22 DIAGNOSIS — I10 ESSENTIAL HYPERTENSION: ICD-10-CM

## 2021-03-22 DIAGNOSIS — I25.10 CAD S/P PERCUTANEOUS CORONARY ANGIOPLASTY: Primary | ICD-10-CM

## 2021-03-22 DIAGNOSIS — Z98.61 CAD S/P PERCUTANEOUS CORONARY ANGIOPLASTY: Primary | ICD-10-CM

## 2021-03-22 DIAGNOSIS — E78.00 PURE HYPERCHOLESTEROLEMIA: ICD-10-CM

## 2021-03-22 PROCEDURE — G8484 FLU IMMUNIZE NO ADMIN: HCPCS | Performed by: INTERNAL MEDICINE

## 2021-03-22 PROCEDURE — 4040F PNEUMOC VAC/ADMIN/RCVD: CPT | Performed by: INTERNAL MEDICINE

## 2021-03-22 PROCEDURE — 99214 OFFICE O/P EST MOD 30 MIN: CPT | Performed by: INTERNAL MEDICINE

## 2021-03-22 PROCEDURE — 3017F COLORECTAL CA SCREEN DOC REV: CPT | Performed by: INTERNAL MEDICINE

## 2021-03-22 PROCEDURE — 93000 ELECTROCARDIOGRAM COMPLETE: CPT | Performed by: INTERNAL MEDICINE

## 2021-03-22 PROCEDURE — G8427 DOCREV CUR MEDS BY ELIG CLIN: HCPCS | Performed by: INTERNAL MEDICINE

## 2021-03-22 PROCEDURE — G8400 PT W/DXA NO RESULTS DOC: HCPCS | Performed by: INTERNAL MEDICINE

## 2021-03-22 PROCEDURE — 1036F TOBACCO NON-USER: CPT | Performed by: INTERNAL MEDICINE

## 2021-03-22 PROCEDURE — 1090F PRES/ABSN URINE INCON ASSESS: CPT | Performed by: INTERNAL MEDICINE

## 2021-03-22 PROCEDURE — G8419 CALC BMI OUT NRM PARAM NOF/U: HCPCS | Performed by: INTERNAL MEDICINE

## 2021-03-22 PROCEDURE — 1123F ACP DISCUSS/DSCN MKR DOCD: CPT | Performed by: INTERNAL MEDICINE

## 2021-03-22 NOTE — PATIENT INSTRUCTIONS
Continue current cardiovascular medications which have been reviewed and discussed individually with you. Continue regular exercises. Primary/secondary prevention is the goal by aggressive risk modification, healthy and therapeutic life style changes for cardiovascular risk reduction. Various goals are discussed and questions answered. Follow-up in 12 months with EKG,. sooner if needed.

## 2021-03-22 NOTE — PROGRESS NOTES
Destiny Encarnacion (:  1945) is a 77 y.o. female,     Patient is here for further evaluation and follow up for known coronary artery disease status post PCI in  and has hypertension and hyperlipidemia. She denies any chest pains or shortness of breath or leg swelling or claudication. She denies any palpitations syncope or near syncope. She has been compliant with her medications. She walks on a treadmill for more than a mile and 18 minutes daily without any problems. She walks at a speed of 3.8 miles an hour. She does not smoke and she is compliant with her medications. Her medications and recent labs from PCP are reviewed from January this year. Patient had both COVID-19 vaccines and has been observing for preventive measures. Allergies   Allergen Reactions    Sulfa Antibiotics Other (See Comments)     Hands and feet peeled. Nausea     Prior to Admission medications    Medication Sig Start Date End Date Taking? Authorizing Provider   atenolol (TENORMIN) 25 MG tablet Take 1 tablet by mouth daily Take as instructed 3/16/20  Yes Marlon Foreman MD   nitroGLYCERIN (NITROSTAT) 0.4 MG SL tablet Place 1 tablet under the tongue every 5 minutes as needed (prn) 3/16/20  Yes Marlon Foreman MD   pravastatin (PRAVACHOL) 20 MG tablet Take 1 tablet by mouth daily 3/16/20  Yes Marlon Foreman MD   ALPRAZolam Eppie Rory) 0.25 MG tablet Take 0.25 mg by mouth nightly as needed for Sleep 1/4 tablet as needed .    Yes Historical Provider, MD   Multiple Vitamins-Minerals (THERAPEUTIC MULTIVITAMIN-MINERALS) tablet Take 1 tablet by mouth daily   Yes Historical Provider, MD   Coenzyme Q10 (COQ10) 200 MG CAPS Take by mouth   Yes Historical Provider, MD   melatonin 5 MG TABS tablet Take 5 mg by mouth daily   Yes Historical Provider, MD   vitamin B-12 (CYANOCOBALAMIN) 1000 MCG tablet Take 1,000 mcg by mouth daily   Yes Historical Provider, MD   Omega-3 Fatty Acids (MINI FISH OIL) 645 MG CAPS Take by mouth   Yes Historical nonpalpable. No epigastric bruit noted. Peripheral pulses: Pedal pulses are 1-2+ equal in both feet. Respiratory:  Respiratory effort is normal. Breath sounds are clear to auscultation. Extremities:  No edema, clubbing,  Cyanosis, petechiae. Abdomen:  No masses or tenderness. No organomegaly noted. Neurologic:  Oriented to time, place, and person and non-anxious. No focal neurological deficit noted. Psychiatric: Normal mood and effect. EKG today is consistent with normal sinus rhythm 80 bpm essentially normal EKG. Pertinent records reviewed and discussed with patient and results are as follow:    Lab Results   Component Value Date    WBC 5.1 04/04/2019    HGB 10.9 04/04/2019    HCT 35.5 04/04/2019     04/04/2019     Lab Results   Component Value Date    CHOL 166 01/17/2020    TRIG 96 01/17/2020    HDL 57 01/17/2020    [address] 90 01/17/2020   Lipids on January 28, 2021 reviewed and PCPs records total cholesterol 148, HDL 60 and LDL 66 and triglycerides 109  Lab Results   Component Value Date    BUN 37 01/17/2020    CREATININE 1.00 01/17/2020     01/17/2020    K 4.3 01/17/2020   BUN 32 creatinine 1.0 potassium 4.2 sodium 142    ASSESSMENT/PLAN:    1. CAD S/P percutaneous coronary angioplasty  Assessment & Plan:  Clinically stable. Continue aggressive risk factor modification for secondary prevention. 2. Shortness of breath  -     EKG 12 lead; Future  3. Essential hypertension  Assessment & Plan:  Well-controlled on current medications continue the same. 4. Pure hypercholesterolemia  Assessment & Plan:  Lipids have improved since last year. LDL dropped from 90 last January to 77 this year in January. Continue current low-dose statin therapy. Continue current cardiovascular medications which have been reviewed and discussed individually with you. Continue regular exercises.  Primary/secondary prevention is the goal by aggressive risk modification, healthy and therapeutic life style changes for cardiovascular risk reduction. Various goals are discussed and questions answered. Follow-up in 12 months with EKG,. sooner if needed. Counseled to continue to follow CDC guidelines for prevention of COVID-19. On this date 3/22/2021 I have spent 25 minutes reviewing previous notes, test results and face to face with the patient discussing the diagnosis and importance of compliance with the treatment plan as well as documenting on the day of the visit. An electronic signature was used to authenticate this note.     --Eliu Mcgraw MD

## 2021-03-31 RX ORDER — ATENOLOL 25 MG/1
25 TABLET ORAL DAILY
Qty: 90 TABLET | Refills: 3 | Status: SHIPPED | OUTPATIENT
Start: 2021-03-31 | End: 2022-03-17

## 2021-03-31 RX ORDER — NITROGLYCERIN 0.4 MG/1
0.4 TABLET SUBLINGUAL EVERY 5 MIN PRN
Qty: 25 TABLET | Refills: 2 | Status: SHIPPED | OUTPATIENT
Start: 2021-03-31

## 2021-03-31 RX ORDER — PRAVASTATIN SODIUM 20 MG
20 TABLET ORAL DAILY
Qty: 90 TABLET | Refills: 3 | Status: SHIPPED | OUTPATIENT
Start: 2021-03-31 | End: 2022-03-17

## 2021-04-08 ENCOUNTER — HOSPITAL ENCOUNTER (OUTPATIENT)
Dept: ULTRASOUND IMAGING | Age: 76
Discharge: HOME | End: 2021-04-08
Payer: MEDICARE

## 2021-04-08 ENCOUNTER — HOSPITAL ENCOUNTER (OUTPATIENT)
Dept: MAMMOGRAPHY | Age: 76
Discharge: HOME | End: 2021-04-08
Payer: MEDICARE

## 2021-04-08 ENCOUNTER — APPOINTMENT (OUTPATIENT)
Dept: MAMMOGRAPHY | Age: 76
End: 2021-04-08
Payer: MEDICARE

## 2021-04-08 DIAGNOSIS — R92.8 ABNORMAL MAMMOGRAM: ICD-10-CM

## 2021-04-08 DIAGNOSIS — N63.0 BREAST MASS: ICD-10-CM

## 2021-04-08 PROCEDURE — G0279 TOMOSYNTHESIS, MAMMO: HCPCS

## 2021-04-08 PROCEDURE — 76642 ULTRASOUND BREAST LIMITED: CPT

## 2022-01-22 NOTE — PATIENT INSTRUCTIONS
Continue current cardiovascular medications which have been reviewed and discussed individually with you. Primary/secondary prevention is the goal by aggressive risk modification, healthy and therapeutic life style changes for cardiovascular risk reduction. Various goals are discussed and questions answered. Appropriate prescriptions if needed on this visit are addressed. After visit summery is provided. Questions answered and patient verbalizes understanding. Follow up in office in 12 months with ECG, sooner if needed.
Xray Knee 4 Views, Bilateral

## 2022-03-17 ENCOUNTER — OFFICE VISIT (OUTPATIENT)
Dept: CARDIOLOGY CLINIC | Age: 77
End: 2022-03-17
Payer: MEDICARE

## 2022-03-17 VITALS
WEIGHT: 95.4 LBS | SYSTOLIC BLOOD PRESSURE: 118 MMHG | BODY MASS INDEX: 16.29 KG/M2 | DIASTOLIC BLOOD PRESSURE: 66 MMHG | HEART RATE: 80 BPM | HEIGHT: 64 IN

## 2022-03-17 DIAGNOSIS — E78.00 PURE HYPERCHOLESTEROLEMIA: ICD-10-CM

## 2022-03-17 DIAGNOSIS — I25.10 CAD S/P PERCUTANEOUS CORONARY ANGIOPLASTY: ICD-10-CM

## 2022-03-17 DIAGNOSIS — Z98.61 CAD S/P PERCUTANEOUS CORONARY ANGIOPLASTY: ICD-10-CM

## 2022-03-17 DIAGNOSIS — I10 ESSENTIAL HYPERTENSION: Primary | ICD-10-CM

## 2022-03-17 PROCEDURE — 4040F PNEUMOC VAC/ADMIN/RCVD: CPT | Performed by: NURSE PRACTITIONER

## 2022-03-17 PROCEDURE — 1036F TOBACCO NON-USER: CPT | Performed by: NURSE PRACTITIONER

## 2022-03-17 PROCEDURE — G8419 CALC BMI OUT NRM PARAM NOF/U: HCPCS | Performed by: NURSE PRACTITIONER

## 2022-03-17 PROCEDURE — G8484 FLU IMMUNIZE NO ADMIN: HCPCS | Performed by: NURSE PRACTITIONER

## 2022-03-17 PROCEDURE — 99214 OFFICE O/P EST MOD 30 MIN: CPT | Performed by: NURSE PRACTITIONER

## 2022-03-17 PROCEDURE — G8400 PT W/DXA NO RESULTS DOC: HCPCS | Performed by: NURSE PRACTITIONER

## 2022-03-17 PROCEDURE — 93000 ELECTROCARDIOGRAM COMPLETE: CPT | Performed by: NURSE PRACTITIONER

## 2022-03-17 PROCEDURE — 1090F PRES/ABSN URINE INCON ASSESS: CPT | Performed by: NURSE PRACTITIONER

## 2022-03-17 PROCEDURE — G8427 DOCREV CUR MEDS BY ELIG CLIN: HCPCS | Performed by: NURSE PRACTITIONER

## 2022-03-17 PROCEDURE — 1123F ACP DISCUSS/DSCN MKR DOCD: CPT | Performed by: NURSE PRACTITIONER

## 2022-03-17 RX ORDER — PRAVASTATIN SODIUM 20 MG
20 TABLET ORAL DAILY
Qty: 90 TABLET | Refills: 3 | Status: SHIPPED | OUTPATIENT
Start: 2022-03-17

## 2022-03-17 RX ORDER — ATENOLOL 25 MG/1
25 TABLET ORAL DAILY
Qty: 90 TABLET | Refills: 3 | Status: SHIPPED | OUTPATIENT
Start: 2022-03-17

## 2022-03-17 ASSESSMENT — ENCOUNTER SYMPTOMS
SHORTNESS OF BREATH: 0
COUGH: 0
SINUS PRESSURE: 1

## 2022-03-17 NOTE — PROGRESS NOTES
Teena Aguilar MD, MD Carole Martin MD Ulus Oxford, MD Sindy Kuster, MD Terance Breaker, APRN Josepha Boon, HEATHER Andres     CARDIOLOGY  NOTE      3/17/2022    Kareem Soto (:  1945) is a 69 y.o. female,an established patient with Dr. Kaela Zhang, here for evaluation of the following chief complaint(s):  Hyperlipidemia (States chest pain that is more muscle pain that is common, SOB that has came upon after sickness, Denies palpitations dizziness and edema. ) and Hypertension        SUBJECTIVE/OBJECTIVE:    Patient is here for further evaluation and follow up for known coronary artery disease status post PCI in  and has hypertension and hyperlipidemia. She denies any chest pains or shortness of breath or leg swelling or claudication. She denies any palpitations syncope or near syncope. She has been compliant with her medications. She had been walking on a treadmill for more than a mile and 18 minutes daily without any problems. She walks at a speed of 3.8 miles an hour, prior to becoming ill over the fall. She is only able to do 0.5 mile a day risght now due to the congestion  She does not smoke and she is compliant with her medications. Her medications and recent labs from PCP are reviewed from January this year. Patient had both COVID-19 vaccines and has been observing for preventive measures. Review of Systems   Constitutional: Negative for fatigue and fever. HENT: Positive for congestion and sinus pressure. Respiratory: Negative for cough and shortness of breath. Cardiovascular: Negative for chest pain, palpitations and leg swelling. Musculoskeletal: Negative for arthralgias and gait problem. Neurological: Negative for dizziness, syncope, weakness, light-headedness and headaches.        Vitals:    22 0910   BP: 118/66   Pulse: 80   Weight: 95 lb 6.4 oz (43.3 kg)   Height: 5' 3.6\" (1.615 m)       Wt Readings from Last 3 Encounters:   03/17/22 95 lb 6.4 oz (43.3 kg)   03/22/21 96 lb 9.6 oz (43.8 kg)   03/16/20 98 lb (44.5 kg)       BP Readings from Last 3 Encounters:   03/17/22 118/66   03/22/21 128/68   03/16/20 126/64       Prior to Admission medications    Medication Sig Start Date End Date Taking? Authorizing Provider   atenolol (TENORMIN) 25 MG tablet Take 1 tablet by mouth daily Take as instructed 3/31/21  Yes Marisol Vincent MD   nitroGLYCERIN (NITROSTAT) 0.4 MG SL tablet Place 1 tablet under the tongue every 5 minutes as needed (prn) 3/31/21  Yes Marisol Vincent MD   pravastatin (PRAVACHOL) 20 MG tablet Take 1 tablet by mouth daily 3/31/21  Yes Marisol Vincent MD   ALPRAZolam Ailyn Duet) 0.25 MG tablet Take 0.25 mg by mouth nightly as needed for Sleep 1/4 tablet as needed . Yes Historical Provider, MD   Multiple Vitamins-Minerals (THERAPEUTIC MULTIVITAMIN-MINERALS) tablet Take 1 tablet by mouth daily   Yes Historical Provider, MD   Coenzyme Q10 (COQ10) 200 MG CAPS Take by mouth   Yes Historical Provider, MD   melatonin 5 MG TABS tablet Take 5 mg by mouth daily   Yes Historical Provider, MD   vitamin B-12 (CYANOCOBALAMIN) 1000 MCG tablet Take 1,000 mcg by mouth daily   Yes Historical Provider, MD   Omega-3 Fatty Acids (MINI FISH OIL) 645 MG CAPS Take by mouth   Yes Historical Provider, MD   calcium carbonate (OSCAL) 500 MG TABS tablet Take 500 mg by mouth 2 times daily   Yes Historical Provider, MD   vitamin C (ASCORBIC ACID) 500 MG tablet Take 1,000 mg by mouth daily   Yes Historical Provider, MD   Biotin 5 MG TABS Take by mouth   Yes Historical Provider, MD   aspirin 81 MG tablet Take 81 mg by mouth daily. Yes Historical Provider, MD   pregabalin (LYRICA) 50 MG capsule Take 50 mg by mouth nightly. Yes Historical Provider, MD       Physical Exam  Vitals reviewed. Constitutional:       General: She is not in acute distress. Appearance: Normal appearance. She is not ill-appearing.    HENT:      Head: Atraumatic. Neck:      Vascular: No carotid bruit. Cardiovascular:      Rate and Rhythm: Normal rate and regular rhythm. Pulses: Normal pulses. Heart sounds: Normal heart sounds. No murmur heard. Pulmonary:      Effort: Pulmonary effort is normal. No respiratory distress. Breath sounds: Normal breath sounds. Musculoskeletal:         General: No swelling or deformity. Cervical back: Neck supple. No muscular tenderness. Neurological:      Mental Status: She is alert. Health Maintenance   Topic Date Due    Hepatitis C screen  Never done    COVID-19 Vaccine (1) Never done    Depression Screen  Never done    DTaP/Tdap/Td vaccine (1 - Tdap) Never done    Shingles Vaccine (1 of 2) Never done    DEXA (modify frequency per FRAX score)  Never done    Pneumococcal 65+ years Vaccine (1 of 1 - PPSV23) Never done   [employer] Visit (AWV)  Never done    Lipid screen  01/17/2021    Potassium monitoring  01/17/2021    Creatinine monitoring  01/17/2021    Flu vaccine  Completed    Hepatitis A vaccine  Aged Out    Hepatitis B vaccine  Aged Out    Hib vaccine  Aged Out    Meningococcal (ACWY) vaccine  Aged Out       Lab Review   Lab Results   Component Value Date    CHOL 166 01/17/2020    TRIG 96 01/17/2020    HDL 57 01/17/2020            ASSESSMENT/PLAN:    1. Essential hypertension  Controlled. Continue atenolol. Patient is tolerating dose well and was not having any adverse side effects we will continue current dose. -     EKG 12 lead; Future  2. CAD S/P LAD stenting 7/2001  Stable. Patient states that she has been feeling well. Denies any chest pain shortness of breath on exertion. Continue atenolol baby aspirin omega-3 fatty acid and pravastatin. 3. Pure hypercholesterolemia  No lipid panel recently to review  Patient reports cholesterol is much better than it had been in 2020. Will attempt to get copy of records from PCP.   Reviewed goal cholesterol LDL less than 70. Patient reports that she consistently significantly less than 70. Return in about 1 year (around 3/17/2023). An electronic signature was used to authenticate this note.     Electronically signed by HEATHER Elaine CNP on 3/17/2022 at 9:30 AM

## 2023-03-20 ENCOUNTER — OFFICE VISIT (OUTPATIENT)
Dept: CARDIOLOGY CLINIC | Age: 78
End: 2023-03-20
Payer: MEDICARE

## 2023-03-20 VITALS
BODY MASS INDEX: 17.05 KG/M2 | HEIGHT: 63 IN | WEIGHT: 96.2 LBS | RESPIRATION RATE: 16 BRPM | HEART RATE: 75 BPM | DIASTOLIC BLOOD PRESSURE: 62 MMHG | SYSTOLIC BLOOD PRESSURE: 122 MMHG

## 2023-03-20 DIAGNOSIS — E78.00 PURE HYPERCHOLESTEROLEMIA: ICD-10-CM

## 2023-03-20 DIAGNOSIS — I10 ESSENTIAL HYPERTENSION: ICD-10-CM

## 2023-03-20 DIAGNOSIS — Z98.61 CAD S/P PERCUTANEOUS CORONARY ANGIOPLASTY: Primary | ICD-10-CM

## 2023-03-20 DIAGNOSIS — I25.10 CORONARY ARTERY DISEASE INVOLVING NATIVE CORONARY ARTERY OF NATIVE HEART WITHOUT ANGINA PECTORIS: Primary | ICD-10-CM

## 2023-03-20 DIAGNOSIS — I25.10 CAD S/P PERCUTANEOUS CORONARY ANGIOPLASTY: Primary | ICD-10-CM

## 2023-03-20 PROCEDURE — 3078F DIAST BP <80 MM HG: CPT | Performed by: INTERNAL MEDICINE

## 2023-03-20 PROCEDURE — 99214 OFFICE O/P EST MOD 30 MIN: CPT | Performed by: INTERNAL MEDICINE

## 2023-03-20 PROCEDURE — G8419 CALC BMI OUT NRM PARAM NOF/U: HCPCS | Performed by: INTERNAL MEDICINE

## 2023-03-20 PROCEDURE — 3074F SYST BP LT 130 MM HG: CPT | Performed by: INTERNAL MEDICINE

## 2023-03-20 PROCEDURE — 93000 ELECTROCARDIOGRAM COMPLETE: CPT | Performed by: INTERNAL MEDICINE

## 2023-03-20 PROCEDURE — 1090F PRES/ABSN URINE INCON ASSESS: CPT | Performed by: INTERNAL MEDICINE

## 2023-03-20 PROCEDURE — 1123F ACP DISCUSS/DSCN MKR DOCD: CPT | Performed by: INTERNAL MEDICINE

## 2023-03-20 PROCEDURE — G8484 FLU IMMUNIZE NO ADMIN: HCPCS | Performed by: INTERNAL MEDICINE

## 2023-03-20 PROCEDURE — 1036F TOBACCO NON-USER: CPT | Performed by: INTERNAL MEDICINE

## 2023-03-20 PROCEDURE — G8400 PT W/DXA NO RESULTS DOC: HCPCS | Performed by: INTERNAL MEDICINE

## 2023-03-20 PROCEDURE — G8427 DOCREV CUR MEDS BY ELIG CLIN: HCPCS | Performed by: INTERNAL MEDICINE

## 2023-03-20 RX ORDER — CYCLOBENZAPRINE HCL 10 MG
10 TABLET ORAL 3 TIMES DAILY PRN
COMMUNITY

## 2023-03-20 RX ORDER — NITROGLYCERIN 0.4 MG/1
0.4 TABLET SUBLINGUAL EVERY 5 MIN PRN
Qty: 25 TABLET | Refills: 2 | Status: SHIPPED | OUTPATIENT
Start: 2023-03-20

## 2023-03-20 RX ORDER — ATENOLOL 25 MG/1
25 TABLET ORAL DAILY
Qty: 90 TABLET | Refills: 3 | Status: SHIPPED | OUTPATIENT
Start: 2023-03-20

## 2023-03-20 RX ORDER — PRAVASTATIN SODIUM 40 MG
40 TABLET ORAL NIGHTLY
Qty: 90 TABLET | Refills: 3 | Status: SHIPPED | OUTPATIENT
Start: 2023-03-20

## 2023-03-20 RX ORDER — ACETAMINOPHEN 325 MG/1
650 TABLET ORAL EVERY 6 HOURS PRN
COMMUNITY

## 2023-03-20 NOTE — ASSESSMENT & PLAN NOTE
Clinically stable. Continue aggressive risk factor modification for secondary prevention.
LDL is trending up it is 88 now. Increase pravastatin to 40 mg daily if tolerated and repeat lipids in 2 to 3 months t.
Well-controlled on atenolol 25 mg daily continue the same.
No

## 2023-03-20 NOTE — PROGRESS NOTES
Plan:  Well-controlled on atenolol 25 mg daily continue the same. 3. Pure hypercholesterolemia  Assessment & Plan:  LDL is trending up it is 88 now. Increase pravastatin to 40 mg daily if tolerated and repeat lipids in 2 to 3 months t. Increase Pravastatin to 40 mg daily and continue other current cardiovascular medications which have been reviewed and discussed individually with you. LDL goal is below 70. Secondary prevention is the goal by aggressive risk modification, healthy and therapeutic life style changes for cardiovascular risk reduction. Various goals are discussed and questions answered. Appropriate prescriptions if needed on this visit are addressed. After visit summery is provided. Questions answered and patient verbalizes understanding. Follow up in 12 months with ECG,  sooner if needed. John Pineda MD, 3/20/2023 10:29 AM     Please note this report has been partially produced using speech recognition software and may contain errors related to that system including errors in grammar, punctuation, and spelling, as well as words and phrases that may be inappropriate. If there are any questions or concerns please feel free to contact the dictating provider for clarification.

## 2023-03-20 NOTE — PATIENT INSTRUCTIONS
Increase Pravastatin to 40 mg daily and continue other current cardiovascular medications which have been reviewed and discussed individually with you. LDL goal is below 70. Secondary prevention is the goal by aggressive risk modification, healthy and therapeutic life style changes for cardiovascular risk reduction. Various goals are discussed and questions answered. Appropriate prescriptions if needed on this visit are addressed. After visit summery is provided. Questions answered and patient verbalizes understanding. Follow up in 12 months with ECG,  sooner if needed.

## 2023-04-28 ENCOUNTER — TELEPHONE (OUTPATIENT)
Dept: CARDIOLOGY CLINIC | Age: 78
End: 2023-04-28

## 2024-02-03 ENCOUNTER — HOSPITAL ENCOUNTER (OUTPATIENT)
Dept: MRI IMAGING | Age: 79
Discharge: HOME | End: 2024-02-03
Attending: FAMILY MEDICINE
Payer: MEDICARE

## 2024-02-03 DIAGNOSIS — M54.16 LUMBAR RADICULOPATHY: ICD-10-CM

## 2024-02-03 PROCEDURE — 72148 MRI LUMBAR SPINE W/O DYE: CPT

## 2024-04-01 ENCOUNTER — OFFICE VISIT (OUTPATIENT)
Dept: CARDIOLOGY CLINIC | Age: 79
End: 2024-04-01
Payer: MEDICARE

## 2024-04-01 VITALS
HEIGHT: 63 IN | BODY MASS INDEX: 17.01 KG/M2 | HEART RATE: 79 BPM | DIASTOLIC BLOOD PRESSURE: 62 MMHG | SYSTOLIC BLOOD PRESSURE: 120 MMHG | WEIGHT: 96 LBS

## 2024-04-01 DIAGNOSIS — E78.00 PURE HYPERCHOLESTEROLEMIA: ICD-10-CM

## 2024-04-01 DIAGNOSIS — I10 ESSENTIAL HYPERTENSION: ICD-10-CM

## 2024-04-01 DIAGNOSIS — R00.2 PALPITATIONS: ICD-10-CM

## 2024-04-01 DIAGNOSIS — D50.8 OTHER IRON DEFICIENCY ANEMIA: ICD-10-CM

## 2024-04-01 DIAGNOSIS — Z98.61 CAD S/P PERCUTANEOUS CORONARY ANGIOPLASTY: Primary | ICD-10-CM

## 2024-04-01 DIAGNOSIS — I25.10 CAD S/P PERCUTANEOUS CORONARY ANGIOPLASTY: Primary | ICD-10-CM

## 2024-04-01 PROBLEM — D50.9 IRON DEFICIENCY ANEMIA: Status: ACTIVE | Noted: 2024-04-01

## 2024-04-01 PROBLEM — D64.89 OTHER SPECIFIED ANEMIAS: Status: ACTIVE | Noted: 2024-04-01

## 2024-04-01 PROBLEM — D64.89 OTHER SPECIFIED ANEMIAS: Status: RESOLVED | Noted: 2024-04-01 | Resolved: 2024-04-01

## 2024-04-01 PROCEDURE — G8427 DOCREV CUR MEDS BY ELIG CLIN: HCPCS | Performed by: INTERNAL MEDICINE

## 2024-04-01 PROCEDURE — 3078F DIAST BP <80 MM HG: CPT | Performed by: INTERNAL MEDICINE

## 2024-04-01 PROCEDURE — 99214 OFFICE O/P EST MOD 30 MIN: CPT | Performed by: INTERNAL MEDICINE

## 2024-04-01 PROCEDURE — G8419 CALC BMI OUT NRM PARAM NOF/U: HCPCS | Performed by: INTERNAL MEDICINE

## 2024-04-01 PROCEDURE — G8400 PT W/DXA NO RESULTS DOC: HCPCS | Performed by: INTERNAL MEDICINE

## 2024-04-01 PROCEDURE — 93000 ELECTROCARDIOGRAM COMPLETE: CPT | Performed by: INTERNAL MEDICINE

## 2024-04-01 PROCEDURE — 3074F SYST BP LT 130 MM HG: CPT | Performed by: INTERNAL MEDICINE

## 2024-04-01 PROCEDURE — 1123F ACP DISCUSS/DSCN MKR DOCD: CPT | Performed by: INTERNAL MEDICINE

## 2024-04-01 PROCEDURE — 1036F TOBACCO NON-USER: CPT | Performed by: INTERNAL MEDICINE

## 2024-04-01 PROCEDURE — 1090F PRES/ABSN URINE INCON ASSESS: CPT | Performed by: INTERNAL MEDICINE

## 2024-04-01 RX ORDER — PRAVASTATIN SODIUM 40 MG
40 TABLET ORAL NIGHTLY
Qty: 90 TABLET | Refills: 3 | Status: SHIPPED | OUTPATIENT
Start: 2024-04-01

## 2024-04-01 RX ORDER — ATENOLOL 25 MG/1
25 TABLET ORAL DAILY
Qty: 90 TABLET | Refills: 3 | Status: SHIPPED | OUTPATIENT
Start: 2024-04-01

## 2024-04-01 NOTE — ASSESSMENT & PLAN NOTE
Most recent LDL was 77 on 1/19/2024 and she is taking and tolerating higher dose of pravastatin since then.  We will continue when she can tolerate and repeat lipids prior to next office visit.

## 2024-04-01 NOTE — ASSESSMENT & PLAN NOTE
Her hemoglobin was 10.9 in 2019 and had not had any repeat hemoglobin since then.  Clinically she does not appear to be anemic.  We will have CBC done with her next semiannual labs done by PCP for the record.

## 2024-04-01 NOTE — PATIENT INSTRUCTIONS
Continue current cardiovascular medications which have been reviewed and discussed individually with you.  Repeat CBC as it has not been done in 4 years and she was anemic in 2019.  Continue to exercise regularly. Appropriate prescriptions if needed on this visit are addressed. After visit summery is provided.   Questions answered and patient verbalizes understanding. Follow up in 12 months with EKG,  sooner if needed.

## 2024-04-01 NOTE — PROGRESS NOTES
6.6 6.8 6.6 6.7 6.7   Albumin 4.2 4.1 4.3 4.0 4.2 4.0   ALT 24 31 25 19 20 26   ALKALINE PHOSPHATASE 80 82 77 73 92 86   AST 33 37 36 30 28 40   BILIRUBIN, TOTAL 0.6 0.7 0.7 0.8 0.6 0.7   ANION GAP 4.4  8.0  5.8  6.2  8.6 5.8   ESTIMATED GFR, NON  AMER 63  64  65  59  66  65      IMPRESSION and RECOMMENDATIONS:      1. CAD S/P LAD stenting 7/2001  Assessment & Plan:  Clinically stable.  Continue aggressive risk factor modification for secondary prevention.   Orders:  -     atenolol (TENORMIN) 25 MG tablet; Take 1 tablet by mouth daily Take as instructed, Disp-90 tablet, R-3Normal  -     pravastatin (PRAVACHOL) 40 MG tablet; Take 1 tablet by mouth nightly, Disp-90 tablet, R-3Normal  2. Palpitations  -     EKG 12 lead; Future  3. Essential hypertension  Assessment & Plan:  Fairly well-controlled on the atenolol 25 mg daily continue the same.    Orders:  -     atenolol (TENORMIN) 25 MG tablet; Take 1 tablet by mouth daily Take as instructed, Disp-90 tablet, R-3Normal  4. Pure hypercholesterolemia  Assessment & Plan:  Most recent LDL was 77 on 1/19/2024 and she is taking and tolerating higher dose of pravastatin since then.  We will continue when she can tolerate and repeat lipids prior to next office visit.    Orders:  -     pravastatin (PRAVACHOL) 40 MG tablet; Take 1 tablet by mouth nightly, Disp-90 tablet, R-3Normal  5. Other iron deficiency anemia  Assessment & Plan:  Her hemoglobin was 10.9 in 2019 and had not had any repeat hemoglobin since then.  Clinically she does not appear to be anemic.  We will have CBC done with her next semiannual labs done by PCP for the record.    Orders:  -     CBC with Auto Differential; Future    Continue current cardiovascular medications which have been reviewed and discussed individually with you.  Repeat CBC as it has not been done in 4 years and she was anemic in 2019.  Continue to exercise regularly. Appropriate prescriptions if needed on this visit are addressed. After

## 2024-07-08 LAB
BASOPHILS ABSOLUTE: 0.03 /ΜL
BASOPHILS RELATIVE PERCENT: 0.8 %
EOSINOPHILS ABSOLUTE: 0.16 /ΜL
EOSINOPHILS RELATIVE PERCENT: 4.1 %
HCT VFR BLD CALC: 38.9 % (ref 36–46)
HEMOGLOBIN: 12.4 G/DL (ref 12–16)
LYMPHOCYTES ABSOLUTE: 1.65 /ΜL
LYMPHOCYTES RELATIVE PERCENT: 42.6 %
MCH RBC QN AUTO: 30.3 PG
MCHC RBC AUTO-ENTMCNC: 31.9 G/DL
MCV RBC AUTO: 35.1 FL
MONOCYTES ABSOLUTE: 0.27 /ΜL
MONOCYTES RELATIVE PERCENT: 7 %
NEUTROPHILS ABSOLUTE: 1.76 /ΜL
NEUTROPHILS RELATIVE PERCENT: 45.5 %
PDW BLD-RTO: 12.6 %
PLATELET # BLD: 135 K/ΜL
PMV BLD AUTO: 12.2 FL
RBC # BLD: 4.09 10^6/ΜL
WBC # BLD: 3.87 10^3/ML

## 2025-03-10 ENCOUNTER — OFFICE VISIT (OUTPATIENT)
Dept: CARDIOLOGY CLINIC | Age: 80
End: 2025-03-10
Payer: MEDICARE

## 2025-03-10 VITALS
BODY MASS INDEX: 16.83 KG/M2 | SYSTOLIC BLOOD PRESSURE: 134 MMHG | HEART RATE: 84 BPM | HEIGHT: 63 IN | WEIGHT: 95 LBS | DIASTOLIC BLOOD PRESSURE: 60 MMHG

## 2025-03-10 DIAGNOSIS — R00.2 PALPITATIONS: ICD-10-CM

## 2025-03-10 DIAGNOSIS — Z98.61 CAD S/P PERCUTANEOUS CORONARY ANGIOPLASTY: Primary | ICD-10-CM

## 2025-03-10 DIAGNOSIS — I10 ESSENTIAL HYPERTENSION: ICD-10-CM

## 2025-03-10 DIAGNOSIS — I25.10 CAD S/P PERCUTANEOUS CORONARY ANGIOPLASTY: Primary | ICD-10-CM

## 2025-03-10 DIAGNOSIS — E78.00 PURE HYPERCHOLESTEROLEMIA: ICD-10-CM

## 2025-03-10 PROCEDURE — G8419 CALC BMI OUT NRM PARAM NOF/U: HCPCS | Performed by: INTERNAL MEDICINE

## 2025-03-10 PROCEDURE — 1123F ACP DISCUSS/DSCN MKR DOCD: CPT | Performed by: INTERNAL MEDICINE

## 2025-03-10 PROCEDURE — 1036F TOBACCO NON-USER: CPT | Performed by: INTERNAL MEDICINE

## 2025-03-10 PROCEDURE — 3075F SYST BP GE 130 - 139MM HG: CPT | Performed by: INTERNAL MEDICINE

## 2025-03-10 PROCEDURE — G8427 DOCREV CUR MEDS BY ELIG CLIN: HCPCS | Performed by: INTERNAL MEDICINE

## 2025-03-10 PROCEDURE — 1090F PRES/ABSN URINE INCON ASSESS: CPT | Performed by: INTERNAL MEDICINE

## 2025-03-10 PROCEDURE — 99214 OFFICE O/P EST MOD 30 MIN: CPT | Performed by: INTERNAL MEDICINE

## 2025-03-10 PROCEDURE — G8400 PT W/DXA NO RESULTS DOC: HCPCS | Performed by: INTERNAL MEDICINE

## 2025-03-10 PROCEDURE — 3078F DIAST BP <80 MM HG: CPT | Performed by: INTERNAL MEDICINE

## 2025-03-10 PROCEDURE — 1159F MED LIST DOCD IN RCRD: CPT | Performed by: INTERNAL MEDICINE

## 2025-03-10 PROCEDURE — 93000 ELECTROCARDIOGRAM COMPLETE: CPT | Performed by: INTERNAL MEDICINE

## 2025-03-10 RX ORDER — NITROGLYCERIN 0.4 MG/1
0.4 TABLET SUBLINGUAL EVERY 5 MIN PRN
Qty: 25 TABLET | Refills: 2 | Status: SHIPPED | OUTPATIENT
Start: 2025-03-10

## 2025-03-10 RX ORDER — ATENOLOL 25 MG/1
25 TABLET ORAL DAILY
Qty: 90 TABLET | Refills: 3 | Status: SHIPPED | OUTPATIENT
Start: 2025-03-10

## 2025-03-10 RX ORDER — PRAVASTATIN SODIUM 40 MG
40 TABLET ORAL NIGHTLY
Qty: 90 TABLET | Refills: 3 | Status: SHIPPED | OUTPATIENT
Start: 2025-03-10

## 2025-03-10 NOTE — PROGRESS NOTES
Hafsa Sanders  1945  Amilcar Cisneros MD      Chief Complaint   Patient presents with    Follow-up    Hypertension    Hyperlipidemia     OV for 1 year check. Pt denies any chest pain,SOB,dizziness,swelling to ankles. Some palpitations.     Chief complaint and HPI:  Hafsa Sanders  is a 80 y.o. female following up for known coronary artery disease and has hypertension and hyperlipidemia.  Occasionally feels some missed beats not very often.  She walks on a treadmill 3.6 miles an hours for about 12 to 15 minutes 6 days a week.  She is compliant to her medications and follow-ups.  She does not smoke.    Rest of the Cardiovascular system review is otherwise unchanged from prior encounter.  Past medical history:  has a past medical history of Fibromyalgia, H/O 24 hour EKG monitoring, H/O cardiac catheterization, H/O cardiovascular stress test, Heart attack (HCC), History of chest x-ray, History of PTCA, Hyperlipidemia, Hypertension, and MI (myocardial infarction) (HCC).  Past surgical history:  has a past surgical history that includes Percutaneous Transluminal Coronary Angio ();  section; Appendectomy; Cataract removal (2013); Skin cancer excision (2017); Cholecystectomy; and Breast biopsy (Left, ).  Social History:   Social History     Tobacco Use    Smoking status: Never    Smokeless tobacco: Never   Substance Use Topics    Alcohol use: No     Family history: family history includes Cancer in her mother; Diabetes in her brother and father; Heart Disease in her father; High Blood Pressure in her brother; High Cholesterol in her sister.  ALLERGIES:  Sulfa antibiotics and Prednisone  Current Outpatient Medications   Medication Sig Dispense Refill    atenolol (TENORMIN) 25 MG tablet Take 1 tablet by mouth daily Take as instructed 90 tablet 3    pravastatin (PRAVACHOL) 40 MG tablet Take 1 tablet by mouth nightly 90 tablet 3    nitroGLYCERIN (NITROSTAT) 0.4 MG SL tablet Place 1 tablet under the

## 2025-03-10 NOTE — ASSESSMENT & PLAN NOTE
Fairly well-controlled on current dose of pravastatin she is alternating 40 mg to 20 mg as she was not able to tolerate 40 mg due to severe myalgias.  Recent LDL was 70.

## 2025-03-10 NOTE — ASSESSMENT & PLAN NOTE
Clinically stable.  Continue risk factor modification and regular exercises and healthy lifestyles for secondary prevention.

## 2025-03-10 NOTE — PATIENT INSTRUCTIONS
Continue current cardiovascular medications which have been reviewed and discussed individually with you.  Continue to exercise regularly.  Primary/secondary prevention is the goal by aggressive risk modification, healthy and therapeutic life style changes for cardiovascular risk reduction. Various goals are discussed and questions answered.  Appropriate prescriptions if needed on this visit are addressed. After visit summery is provided.   Questions answered and patient verbalizes understanding. Follow up in 12 months with ECG,  sooner if needed.